# Patient Record
Sex: MALE | Race: WHITE | Employment: OTHER
[De-identification: names, ages, dates, MRNs, and addresses within clinical notes are randomized per-mention and may not be internally consistent; named-entity substitution may affect disease eponyms.]

---

## 2022-03-18 PROBLEM — G45.9 TIA (TRANSIENT ISCHEMIC ATTACK): Status: ACTIVE | Noted: 2017-03-27

## 2022-03-19 PROBLEM — Z86.73 HISTORY OF TIA (TRANSIENT ISCHEMIC ATTACK): Status: ACTIVE | Noted: 2018-08-16

## 2022-03-19 PROBLEM — I10 ESSENTIAL HYPERTENSION: Status: ACTIVE | Noted: 2017-03-27

## 2022-03-20 PROBLEM — R00.2 PALPITATIONS: Status: ACTIVE | Noted: 2017-03-27

## 2022-06-23 ENCOUNTER — TELEPHONE (OUTPATIENT)
Dept: INTERNAL MEDICINE CLINIC | Facility: CLINIC | Age: 72
End: 2022-06-23

## 2022-06-23 NOTE — TELEPHONE ENCOUNTER
----- Message from April Nagle sent at 6/23/2022  1:39 PM EDT -----  Subject: Message to Provider    QUESTIONS  Information for Provider? Patient spoke with Sussy Leos earlier today. He   states the information can be faxed to [phone] at the [facility], attention   Dr. Davon Jacome. Any other questions please call patient back to advise,   thank you   ---------------------------------------------------------------------------  --------------  CALL BACK INFO  What is the best way for the office to contact you? OK to leave message on   voicemail  Preferred Call Back Phone Number? [phone]  ---------------------------------------------------------------------------  --------------  SCRIPT ANSWERS  Relationship to Patient?  Self

## 2022-06-23 NOTE — TELEPHONE ENCOUNTER
Pt report that the [place] will need med record and dexa scan for them to provided the prolia injection I have left a message with the [place] requesting a fax number that I can send these records to.

## 2022-06-23 NOTE — TELEPHONE ENCOUNTER
Pt called and stated that he may be able to get his Prolia injection through the [place]. Please call him to discuss this.

## 2022-06-24 ENCOUNTER — TELEPHONE (OUTPATIENT)
Dept: INTERNAL MEDICINE CLINIC | Facility: CLINIC | Age: 72
End: 2022-06-24

## 2022-06-24 NOTE — TELEPHONE ENCOUNTER
----- Message from Bridgett Roger sent at 6/24/2022  3:23 PM EDT -----  Subject: Message to Provider    QUESTIONS  Information for Provider? Pt states he needs to speak with someone   regarding a Prolia injection. He states that he is trying to have this   [facility], but in order to do so they need something from his   pcp stating why he needs this, along with pts bone density results and   pcps notes. Pt would like to have this in the form of an email. Please   email this at [email or web address]. Please call the pt when this has been   done  ---------------------------------------------------------------------------  --------------  CALL BACK INFO  What is the best way for the office to contact you? OK to leave message on   voicemail  Preferred Call Back Phone Number? [phone]  ---------------------------------------------------------------------------  --------------  SCRIPT ANSWERS  Relationship to Patient?  Self

## 2022-06-27 NOTE — TELEPHONE ENCOUNTER
Is the patient needing a letter from Dr Darius Paniagua? Or will the Notes work? It can be emailed from the printer up front.

## 2022-06-29 ENCOUNTER — PATIENT MESSAGE (OUTPATIENT)
Dept: INTERNAL MEDICINE CLINIC | Facility: CLINIC | Age: 72
End: 2022-06-29

## 2022-06-29 NOTE — TELEPHONE ENCOUNTER
From: Clayton Hdz  Sent: 6/29/2022 4:48 PM EDT  To: [facility] Clinical Staff  Subject: [facility]    According to Dr. Yenny Dale this information and charts have to be sent in a secure login under my name for Dr. Yenny Dale to see them. I would like for you to send them to me and I will attach them to my secure [facility] to Dr. Yenny Dale. I dont know the problem but that is the procedure she wants.

## 2022-08-04 ENCOUNTER — TELEPHONE (OUTPATIENT)
Dept: INTERNAL MEDICINE CLINIC | Facility: CLINIC | Age: 72
End: 2022-08-04

## 2022-08-04 NOTE — TELEPHONE ENCOUNTER
Lisbeth with [facility] called and stated that the patient would like them to help cover his medication prolia and they need the chart notes faxed about the medication that is being prescribed. She is also faxing over a request as well.

## 2022-09-12 SDOH — HEALTH STABILITY: PHYSICAL HEALTH: ON AVERAGE, HOW MANY DAYS PER WEEK DO YOU ENGAGE IN MODERATE TO STRENUOUS EXERCISE (LIKE A BRISK WALK)?: 5 DAYS

## 2022-09-12 SDOH — HEALTH STABILITY: PHYSICAL HEALTH: ON AVERAGE, HOW MANY MINUTES DO YOU ENGAGE IN EXERCISE AT THIS LEVEL?: 40 MIN

## 2022-09-12 ASSESSMENT — LIFESTYLE VARIABLES
HOW MANY STANDARD DRINKS CONTAINING ALCOHOL DO YOU HAVE ON A TYPICAL DAY: 1 OR 2
HOW OFTEN DO YOU HAVE A DRINK CONTAINING ALCOHOL: 3
HOW OFTEN DO YOU HAVE SIX OR MORE DRINKS ON ONE OCCASION: 1
HOW OFTEN DO YOU HAVE A DRINK CONTAINING ALCOHOL: 2-4 TIMES A MONTH
HOW MANY STANDARD DRINKS CONTAINING ALCOHOL DO YOU HAVE ON A TYPICAL DAY: 1

## 2022-09-12 ASSESSMENT — PATIENT HEALTH QUESTIONNAIRE - PHQ9
SUM OF ALL RESPONSES TO PHQ QUESTIONS 1-9: 0
SUM OF ALL RESPONSES TO PHQ9 QUESTIONS 1 & 2: 0
1. LITTLE INTEREST OR PLEASURE IN DOING THINGS: 0
SUM OF ALL RESPONSES TO PHQ QUESTIONS 1-9: 0
2. FEELING DOWN, DEPRESSED OR HOPELESS: 0

## 2022-09-19 ENCOUNTER — OFFICE VISIT (OUTPATIENT)
Dept: INTERNAL MEDICINE CLINIC | Facility: CLINIC | Age: 72
End: 2022-09-19
Payer: MEDICARE

## 2022-09-19 VITALS
TEMPERATURE: 97.7 F | RESPIRATION RATE: 16 BRPM | OXYGEN SATURATION: 99 % | SYSTOLIC BLOOD PRESSURE: 145 MMHG | WEIGHT: 145 LBS | HEIGHT: 66 IN | BODY MASS INDEX: 23.3 KG/M2 | HEART RATE: 60 BPM | DIASTOLIC BLOOD PRESSURE: 71 MMHG

## 2022-09-19 DIAGNOSIS — I10 ESSENTIAL (PRIMARY) HYPERTENSION: ICD-10-CM

## 2022-09-19 DIAGNOSIS — E78.2 MIXED HYPERLIPIDEMIA: ICD-10-CM

## 2022-09-19 DIAGNOSIS — M81.0 AGE-RELATED OSTEOPOROSIS WITHOUT CURRENT PATHOLOGICAL FRACTURE: ICD-10-CM

## 2022-09-19 DIAGNOSIS — Z00.00 MEDICARE ANNUAL WELLNESS VISIT, SUBSEQUENT: Primary | ICD-10-CM

## 2022-09-19 DIAGNOSIS — R41.3 MEMORY CHANGE: ICD-10-CM

## 2022-09-19 DIAGNOSIS — Z86.73 HISTORY OF TIA (TRANSIENT ISCHEMIC ATTACK): ICD-10-CM

## 2022-09-19 DIAGNOSIS — Z23 ENCOUNTER FOR IMMUNIZATION: ICD-10-CM

## 2022-09-19 PROCEDURE — G0008 ADMIN INFLUENZA VIRUS VAC: HCPCS | Performed by: INTERNAL MEDICINE

## 2022-09-19 PROCEDURE — G0439 PPPS, SUBSEQ VISIT: HCPCS | Performed by: INTERNAL MEDICINE

## 2022-09-19 PROCEDURE — 90694 VACC AIIV4 NO PRSRV 0.5ML IM: CPT | Performed by: INTERNAL MEDICINE

## 2022-09-19 PROCEDURE — 1123F ACP DISCUSS/DSCN MKR DOCD: CPT | Performed by: INTERNAL MEDICINE

## 2022-09-19 PROCEDURE — 99213 OFFICE O/P EST LOW 20 MIN: CPT | Performed by: INTERNAL MEDICINE

## 2022-09-19 ASSESSMENT — PATIENT HEALTH QUESTIONNAIRE - PHQ9
SUM OF ALL RESPONSES TO PHQ QUESTIONS 1-9: 0
2. FEELING DOWN, DEPRESSED OR HOPELESS: 0
SUM OF ALL RESPONSES TO PHQ QUESTIONS 1-9: 0
1. LITTLE INTEREST OR PLEASURE IN DOING THINGS: 0
SUM OF ALL RESPONSES TO PHQ9 QUESTIONS 1 & 2: 0

## 2022-09-19 ASSESSMENT — LIFESTYLE VARIABLES
HOW MANY STANDARD DRINKS CONTAINING ALCOHOL DO YOU HAVE ON A TYPICAL DAY: 1 OR 2
HOW OFTEN DO YOU HAVE A DRINK CONTAINING ALCOHOL: 2-4 TIMES A MONTH

## 2022-09-19 NOTE — PROGRESS NOTES
Medicare Annual Wellness Visit    Kevyn Fung is here for Medicare AWV, Follow-up Chronic Condition (6 month f/u), and Discuss Medications (? About prevagen)    Assessment & Plan   Medicare annual wellness visit, subsequent  Essential (primary) hypertension  -     Basic Metabolic Panel; Future  -     Vitamin D 25 Hydroxy; Future  -     TSH; Future  Mixed hyperlipidemia  Age-related osteoporosis without current pathological fracture  -     Basic Metabolic Panel; Future  -     Vitamin D 25 Hydroxy; Future  History of TIA (transient ischemic attack)  Encounter for immunization  -     Influenza, FLUAD, (age 72 y+), IM, Preservative Free, 0.5 mL  Memory change  -     TSH; Future  -     Vitamin B12; Future   Will try to assist in getting his Prolia injection using his medicare insurance. Will have him return for visit to address his memory concerns specifically. Recommendations for Preventive Services Due: see orders and patient instructions/AVS.  Recommended screening schedule for the next 5-10 years is provided to the patient in written form: see Patient Instructions/AVS.  6 months routine follow up    [facility] will cover the cost of his Prolia but has to have it done at      Return for Medicare Annual Wellness Visit in 1 year. Subjective   The following acute and/or chronic problems were also addressed today:  Chronic medical problems HTN, HLD, GERD. TIA, Osteoporosis   He has been doing well with no new complaints. No new neuro symptoms. No cp or sob. Zocor for HLD and also on low fat diet. He takes PPI forGERD symptoms. On prolia for osteoporosis. Sometimes has pain in the lower back feels like vertebra is slipping back in nd forths. Not quite as sharp. Issues with names. Having some issues getting prolia injections throught [facility] [address] [facility]. He is already 2 months late getting his injection. May not be able to obtain until next year!!! Studies show significant bone loss once meds discontinued.  He is quite normal rate, normal S1 and S2, no gallops, intact distal pulses, and no carotid bruits  Abdomen: soft, non-tender, non-distended, normal bowel sounds, no masses or organomegaly  Extremities: no cyanosis and no clubbing  Neurologic: gait and coordination normal and speech normal       No Known Allergies  Prior to Visit Medications    Medication Sig Taking? Authorizing Provider   amLODIPine (NORVASC) 2.5 MG tablet TAKE ONE TABLET BY MOUTH EVERY DAY FOR BLOOD PRESSURE Yes Ar Automatic Reconciliation   aspirin 81 MG EC tablet Take 81 mg by mouth daily Yes Ar Automatic Reconciliation   clobetasol (TEMOVATE) 0.05 % external solution APPLY TO SCALP TWICE DAILY.  Yes Ar Automatic Reconciliation   omeprazole (PRILOSEC) 20 MG delayed release capsule TAKE ONE CAPSULE BY MOUTH EVERY DAY Yes Ar Automatic Reconciliation   simvastatin (ZOCOR) 20 MG tablet TAKE ONE TABLET BY MOUTH EVERY EVENING FOR CHOLESTEROL Yes Ar Automatic Reconciliation   denosumab (PROLIA) 60 MG/ML SOSY SC injection Inject 60 mg into the skin  Patient not taking: Reported on 9/19/2022  Ar Automatic Reconciliation       CareTeam (Including outside providers/suppliers regularly involved in providing care):   Patient Care Team:  Lyman Angelucci, MD as PCP - Fabián Giang MD as PCP - [facility] Empaneled Provider     Reviewed and updated this visit:  Allergies  Meds  Med Hx  Surg Hx  Soc Hx  Fam Hx

## 2022-09-19 NOTE — PATIENT INSTRUCTIONS
Personalized Preventive Plan for Ophelia Lim - 9/19/2022  Medicare offers a range of preventive health benefits. Some of the tests and screenings are paid in full while other may be subject to a deductible, co-insurance, and/or copay. Some of these benefits include a comprehensive review of your medical history including lifestyle, illnesses that may run in your family, and various assessments and screenings as appropriate. After reviewing your medical record and screening and assessments performed today your provider may have ordered immunizations, labs, imaging, and/or referrals for you. A list of these orders (if applicable) as well as your Preventive Care list are included within your After Visit Summary for your review. Other Preventive Recommendations:    A preventive eye exam performed by an eye specialist is recommended every 1-2 years to screen for glaucoma; cataracts, macular degeneration, and other eye disorders. A preventive dental visit is recommended every 6 months. Try to get at least 150 minutes of exercise per week or 10,000 steps per day on a pedometer . Order or download the FREE \"Exercise & Physical Activity: Your Everyday Guide\" from [employer]. Call 1-256.743.1887 or search [employer] online. You need 9151-9172 mg of calcium and 2944-6374 IU of vitamin D per day. It is possible to meet your calcium requirement with diet alone, but a vitamin D supplement is usually necessary to meet this goal.  When exposed to the sun, use a sunscreen that protects against both UVA and UVB radiation with an SPF of 30 or greater. Reapply every 2 to 3 hours or after sweating, drying off with a towel, or swimming. Always wear a seat belt when traveling in a car. Always wear a helmet when riding a bicycle or motorcycle.

## 2022-09-21 ENCOUNTER — NURSE ONLY (OUTPATIENT)
Dept: INTERNAL MEDICINE CLINIC | Facility: CLINIC | Age: 72
End: 2022-09-21

## 2022-09-21 DIAGNOSIS — R41.3 MEMORY CHANGE: ICD-10-CM

## 2022-09-21 DIAGNOSIS — M81.0 AGE-RELATED OSTEOPOROSIS WITHOUT CURRENT PATHOLOGICAL FRACTURE: Primary | ICD-10-CM

## 2022-09-21 DIAGNOSIS — M81.0 AGE-RELATED OSTEOPOROSIS WITHOUT CURRENT PATHOLOGICAL FRACTURE: ICD-10-CM

## 2022-09-21 DIAGNOSIS — I10 ESSENTIAL (PRIMARY) HYPERTENSION: ICD-10-CM

## 2022-09-22 LAB
25(OH)D3 SERPL-MCNC: 60.2 NG/ML (ref 30–100)
ANION GAP SERPL CALC-SCNC: 4 MMOL/L (ref 4–13)
BUN SERPL-MCNC: 13 MG/DL (ref 8–23)
CALCIUM SERPL-MCNC: 9.4 MG/DL (ref 8.3–10.4)
CHLORIDE SERPL-SCNC: 108 MMOL/L (ref 101–110)
CO2 SERPL-SCNC: 28 MMOL/L (ref 21–32)
CREAT SERPL-MCNC: 1 MG/DL (ref 0.8–1.5)
GLUCOSE SERPL-MCNC: 109 MG/DL (ref 65–100)
POTASSIUM SERPL-SCNC: 4 MMOL/L (ref 3.5–5.1)
SODIUM SERPL-SCNC: 140 MMOL/L (ref 136–145)
TSH, 3RD GENERATION: 1.72 UIU/ML (ref 0.36–3.74)
VIT B12 SERPL-MCNC: 395 PG/ML (ref 193–986)

## 2022-10-11 ENCOUNTER — TELEPHONE (OUTPATIENT)
Dept: INTERNAL MEDICINE CLINIC | Facility: CLINIC | Age: 72
End: 2022-10-11

## 2022-10-11 ENCOUNTER — NURSE ONLY (OUTPATIENT)
Dept: INTERNAL MEDICINE CLINIC | Facility: CLINIC | Age: 72
End: 2022-10-11

## 2022-10-11 DIAGNOSIS — M81.0 AGE-RELATED OSTEOPOROSIS WITHOUT CURRENT PATHOLOGICAL FRACTURE: Primary | ICD-10-CM

## 2022-10-11 DIAGNOSIS — M81.0 AGE-RELATED OSTEOPOROSIS WITHOUT CURRENT PATHOLOGICAL FRACTURE: ICD-10-CM

## 2022-10-11 NOTE — TELEPHONE ENCOUNTER
Patient will need to have a vitamin d and calcium drawn before they can have the prolia injection. It has to be within 14 days of the appointment.  Patient can either come to our office and have it drawn or either to the Outpatient Lab,

## 2022-10-12 LAB
25(OH)D3 SERPL-MCNC: 60.7 NG/ML (ref 30–100)
CALCIUM SERPL-MCNC: 9.1 MG/DL (ref 8.3–10.4)

## 2022-10-13 ENCOUNTER — TELEPHONE (OUTPATIENT)
Dept: INTERNAL MEDICINE CLINIC | Facility: CLINIC | Age: 72
End: 2022-10-13

## 2022-10-13 NOTE — TELEPHONE ENCOUNTER
His calcium and vitamin d levels were normal. Please assist in getting scheduled for Prolia at [facility].   Tanya Ward MD

## 2022-10-17 ENCOUNTER — OFFICE VISIT (OUTPATIENT)
Dept: INTERNAL MEDICINE CLINIC | Facility: CLINIC | Age: 72
End: 2022-10-17
Payer: OTHER GOVERNMENT

## 2022-10-17 VITALS
OXYGEN SATURATION: 97 % | TEMPERATURE: 98.2 F | DIASTOLIC BLOOD PRESSURE: 68 MMHG | WEIGHT: 147 LBS | HEART RATE: 90 BPM | BODY MASS INDEX: 23.63 KG/M2 | SYSTOLIC BLOOD PRESSURE: 119 MMHG | RESPIRATION RATE: 16 BRPM | HEIGHT: 66 IN

## 2022-10-17 DIAGNOSIS — M81.0 AGE-RELATED OSTEOPOROSIS WITHOUT CURRENT PATHOLOGICAL FRACTURE: Primary | ICD-10-CM

## 2022-10-17 DIAGNOSIS — R41.3 MEMORY CHANGE: ICD-10-CM

## 2022-10-17 PROCEDURE — 3078F DIAST BP <80 MM HG: CPT | Performed by: INTERNAL MEDICINE

## 2022-10-17 PROCEDURE — 99212 OFFICE O/P EST SF 10 MIN: CPT | Performed by: INTERNAL MEDICINE

## 2022-10-17 PROCEDURE — 3074F SYST BP LT 130 MM HG: CPT | Performed by: INTERNAL MEDICINE

## 2022-10-17 PROCEDURE — 1123F ACP DISCUSS/DSCN MKR DOCD: CPT | Performed by: INTERNAL MEDICINE

## 2022-10-17 RX ORDER — CETIRIZINE HYDROCHLORIDE 10 MG/1
10 TABLET ORAL DAILY
COMMUNITY

## 2022-10-17 ASSESSMENT — PATIENT HEALTH QUESTIONNAIRE - PHQ9
SUM OF ALL RESPONSES TO PHQ QUESTIONS 1-9: 0
SUM OF ALL RESPONSES TO PHQ QUESTIONS 1-9: 0
1. LITTLE INTEREST OR PLEASURE IN DOING THINGS: 0
SUM OF ALL RESPONSES TO PHQ QUESTIONS 1-9: 0
2. FEELING DOWN, DEPRESSED OR HOPELESS: 0
SUM OF ALL RESPONSES TO PHQ9 QUESTIONS 1 & 2: 0
SUM OF ALL RESPONSES TO PHQ QUESTIONS 1-9: 0

## 2022-10-26 ENCOUNTER — NURSE ONLY (OUTPATIENT)
Dept: INTERNAL MEDICINE CLINIC | Facility: CLINIC | Age: 72
End: 2022-10-26

## 2022-10-26 DIAGNOSIS — D64.9 ANEMIA, UNSPECIFIED TYPE: ICD-10-CM

## 2022-10-27 LAB
BASOPHILS # BLD: 0.1 K/UL (ref 0–0.2)
BASOPHILS NFR BLD: 1 % (ref 0–2)
DIFFERENTIAL METHOD BLD: ABNORMAL
EOSINOPHIL # BLD: 0.3 K/UL (ref 0–0.8)
EOSINOPHIL NFR BLD: 5 % (ref 0.5–7.8)
ERYTHROCYTE [DISTWIDTH] IN BLOOD BY AUTOMATED COUNT: 14.6 % (ref 11.9–14.6)
FERRITIN SERPL-MCNC: 10 NG/ML (ref 8–388)
HCT VFR BLD AUTO: 36.9 % (ref 41.1–50.3)
HGB BLD-MCNC: 11.2 G/DL (ref 13.6–17.2)
IMM GRANULOCYTES # BLD AUTO: 0 K/UL (ref 0–0.5)
IMM GRANULOCYTES NFR BLD AUTO: 0 % (ref 0–5)
IRON SERPL-MCNC: 32 UG/DL (ref 35–150)
LYMPHOCYTES # BLD: 2.2 K/UL (ref 0.5–4.6)
LYMPHOCYTES NFR BLD: 38 % (ref 13–44)
MCH RBC QN AUTO: 27 PG (ref 26.1–32.9)
MCHC RBC AUTO-ENTMCNC: 30.4 G/DL (ref 31.4–35)
MCV RBC AUTO: 88.9 FL (ref 82–102)
MONOCYTES # BLD: 0.5 K/UL (ref 0.1–1.3)
MONOCYTES NFR BLD: 9 % (ref 4–12)
NEUTS SEG # BLD: 2.8 K/UL (ref 1.7–8.2)
NEUTS SEG NFR BLD: 48 % (ref 43–78)
NRBC # BLD: 0 K/UL (ref 0–0.2)
PLATELET # BLD AUTO: 206 K/UL (ref 150–450)
PMV BLD AUTO: 9.8 FL (ref 9.4–12.3)
RBC # BLD AUTO: 4.15 M/UL (ref 4.23–5.6)
VIT B12 SERPL-MCNC: 416 PG/ML (ref 193–986)
WBC # BLD AUTO: 5.8 K/UL (ref 4.3–11.1)

## 2022-10-27 NOTE — PROGRESS NOTES
10/17/2022   Location:[facility] [address] [facility]  [place]  Patient #:  [id number]  YOB: 1950        History of Present Illness     Chief Complaint   Patient presents with    Memory Loss       Mr. Nette Sheikh is a 67 y.o. male  who presents for Follow up on chronic medical issues. There is compliance and tolerance with medications. Feels like he is a kelley more forgetful with names and events. Remembers meds. He mention at last visit. Wife also mentioned when she was here for her appt. No forgetting conversations. He does have hearing issues and he has lost     Still waiting to appointment to get Prolia injection at [facility] in [facility]. He has been on Prolia for years. Want to avoid delay in getting injection.     Last Labs  CBC with Differential:    Lab Results   Component Value Date/Time    WBC 5.8 10/26/2022 03:24 PM    RBC 4.15 10/26/2022 03:24 PM    HGB 11.2 10/26/2022 03:24 PM    HCT 36.9 10/26/2022 03:24 PM     10/26/2022 03:24 PM    MCV 88.9 10/26/2022 03:24 PM    MCH 27.0 10/26/2022 03:24 PM    MCHC 30.4 10/26/2022 03:24 PM    RDW 14.6 10/26/2022 03:24 PM    NRBC 0.00 10/26/2022 03:24 PM    LYMPHOPCT 38 10/26/2022 03:24 PM    LYMPHOPCT 37 09/13/2021 10:25 AM    MONOPCT 9 10/26/2022 03:24 PM    MONOPCT 9 09/13/2021 10:25 AM    BASOPCT 1 10/26/2022 03:24 PM    BASOPCT 1 09/13/2021 10:25 AM    MONOSABS 0.5 10/26/2022 03:24 PM    MONOSABS 0.4 09/13/2021 10:25 AM    LYMPHSABS 2.2 10/26/2022 03:24 PM    LYMPHSABS 1.9 09/13/2021 10:25 AM    EOSABS 0.3 10/26/2022 03:24 PM    EOSABS 0.2 09/13/2021 10:25 AM    BASOSABS 0.1 10/26/2022 03:24 PM    DIFFTYPE AUTOMATED 10/26/2022 03:24 PM     BMP:    Lab Results   Component Value Date/Time     09/21/2022 08:05 AM    K 4.0 09/21/2022 08:05 AM     09/21/2022 08:05 AM    CO2 28 09/21/2022 08:05 AM    BUN 13 09/21/2022 08:05 AM    LABALBU 4.7 09/13/2021 10:25 AM    CREATININE 1.00 09/21/2022 08:05 AM    CALCIUM 9.1 10/11/2022 03:29 PM    GFRAA >60 09/21/2022 08:05 AM    LABGLOM >60 09/21/2022 08:05 AM    GLUCOSE 109 09/21/2022 08:05 AM     FLP:    Lab Results   Component Value Date/Time    TRIG 78 09/13/2021 10:25 AM    HDL 47 09/13/2021 10:25 AM    LDLCALC 89 09/13/2021 10:25 AM    LABVLDL 14 08/26/2019 08:39 AM     TSH:    Lab Results   Component Value Date/Time    TSH 1.320 09/13/2021 10:25 AM     Lab Results   Component Value Date/Time    VITD25 60.7 10/11/2022 03:29 PM      Lab Results   Component Value Date    PBIYGACD30 666 09/21/2022         No Known Allergies  Past Medical History:   Diagnosis Date    Congenital tracheoesophageal fistula, esophageal atresia and stenosis     Esophageal reflux     Essential hypertension 3/27/2017    GERD (gastroesophageal reflux disease)     History of TIA (transient ischemic attack) 8/16/2018    Hypercholesterolemia     Inguinal hernia without mention of obstruction or gangrene, unilateral or unspecified, (not specified as recurrent)     Other and unspecified hyperlipidemia     Palpitations 3/27/2017    Psoriasis     Senile osteoporosis     Stroke [facility])     TIA (transient ischemic attack) 3/27/2017    Unspecified cataract      Social History     Socioeconomic History    Marital status:      Spouse name: None    Number of children: None    Years of education: None    Highest education level: None   Tobacco Use    Smoking status: Never    Smokeless tobacco: Never   Substance and Sexual Activity    Alcohol use: Yes    Drug use: No     Social Determinants of Health     Physical Activity: Sufficiently Active    Days of Exercise per Week: 5 days    Minutes of Exercise per Session: 50 min     Past Surgical History:   Procedure Laterality Date    CATARACT REMOVAL Left 2/24/15    CATARACT REMOVAL Right 2/3/15    insert prosthetic lens     GI      colonoscopy times 2    HERNIA REPAIR Right 3/24/14    HIP FRACTURE SURGERY Left 7/12/15    ORTHOPEDIC SURGERY      left thumb     VASECTOMY  1985     Family History   Problem Relation Age of Onset    Hypertension Mother     Diabetes Maternal Grandmother     Glaucoma Mother     Dementia Father     Prostate Cancer Father      Current Outpatient Medications   Medication Sig Dispense Refill    cetirizine (ZYRTEC) 10 MG tablet Take 10 mg by mouth daily      denosumab (PROLIA) 60 MG/ML SOSY SC injection Inject 1 mL into the skin every 6 months 1 each 1    amLODIPine (NORVASC) 2.5 MG tablet TAKE ONE TABLET BY MOUTH EVERY DAY FOR BLOOD PRESSURE      aspirin 81 MG EC tablet Take 81 mg by mouth daily      clobetasol (TEMOVATE) 0.05 % external solution APPLY TO SCALP TWICE DAILY. omeprazole (PRILOSEC) 20 MG delayed release capsule TAKE ONE CAPSULE BY MOUTH EVERY DAY      simvastatin (ZOCOR) 20 MG tablet TAKE ONE TABLET BY MOUTH EVERY EVENING FOR CHOLESTEROL      denosumab (PROLIA) 60 MG/ML SOSY SC injection Inject 60 mg into the skin (Patient not taking: No sig reported)       No current facility-administered medications for this visit. Health Maintenance   Topic Date Due    Hepatitis C screen  Never done    COVID-19 Vaccine (4 - Booster for Moderna series) 12/25/2021    Lipids  09/13/2022    Colorectal Cancer Screen  02/06/2023    DEXA (modify frequency per FRAX score)  03/11/2023    Annual Wellness Visit (AWV)  09/20/2023    Depression Screen  10/17/2023    DTaP/Tdap/Td vaccine (3 - Td or Tdap) 04/20/2026    Flu vaccine  Completed    Shingles vaccine  Completed    Pneumococcal 65+ years Vaccine  Completed    Hepatitis A vaccine  Aged Out    Hib vaccine  Aged Out    Meningococcal (ACWY) vaccine  Aged Out             Review of Systems  Review of Systems   Psychiatric/Behavioral:  Positive for decreased concentration.       /68 (Site: Left Upper Arm, Position: Sitting)   Pulse 90   Temp 98.2 °F (36.8 °C) (Temporal)   Resp 16   Ht 5' 5.5\" (1.664 m)   Wt 147 lb (66.7 kg)   SpO2 97%   BMI 24.09 kg/m²     Wt Readings from Last 3 Encounters:   10/17/22 147 lb (66.7 kg)   09/19/22 145 lb (65.8 kg)   03/18/22 155 lb 9.6 oz (70.6 kg)       Physical Exam    Physical Exam  Vitals and nursing note reviewed. Neurological:      General: No focal deficit present. Assessment & Plan    Encounter Diagnoses   Name Primary? Age-related osteoporosis without current pathological fracture Yes    Memory change        29/30 MMSE, clock draw correct, did well with animals in    Secs  Monitor and repeat MMSE periodically. Follow up on scheduling Prolia. No follow-ups on file.         Tanya Ward MD

## 2022-10-28 ENCOUNTER — TELEPHONE (OUTPATIENT)
Dept: INTERNAL MEDICINE CLINIC | Facility: CLINIC | Age: 72
End: 2022-10-28
Payer: MEDICARE

## 2022-10-28 DIAGNOSIS — D64.9 ANEMIA, UNSPECIFIED TYPE: Primary | ICD-10-CM

## 2022-10-28 PROCEDURE — G0328 FECAL BLOOD SCRN IMMUNOASSAY: HCPCS | Performed by: INTERNAL MEDICINE

## 2022-10-31 LAB
HEMOCCULT STL QL: NORMAL
VALID INTERNAL CONTROL: YES

## 2023-01-10 RX ORDER — OMEPRAZOLE 20 MG/1
CAPSULE, DELAYED RELEASE ORAL
Qty: 90 CAPSULE | Refills: 3 | Status: SHIPPED | OUTPATIENT
Start: 2023-01-10

## 2023-01-10 RX ORDER — AMLODIPINE BESYLATE 2.5 MG/1
TABLET ORAL
Qty: 90 TABLET | Refills: 3 | Status: SHIPPED | OUTPATIENT
Start: 2023-01-10

## 2023-01-10 RX ORDER — SIMVASTATIN 20 MG
TABLET ORAL
Qty: 90 TABLET | Refills: 3 | Status: SHIPPED | OUTPATIENT
Start: 2023-01-10

## 2023-01-25 ENCOUNTER — OFFICE VISIT (OUTPATIENT)
Dept: ENDOCRINOLOGY | Age: 73
End: 2023-01-25
Payer: OTHER GOVERNMENT

## 2023-01-25 VITALS
SYSTOLIC BLOOD PRESSURE: 152 MMHG | DIASTOLIC BLOOD PRESSURE: 82 MMHG | WEIGHT: 152 LBS | HEART RATE: 73 BPM | OXYGEN SATURATION: 99 % | BODY MASS INDEX: 24.91 KG/M2

## 2023-01-25 DIAGNOSIS — M81.0 AGE-RELATED OSTEOPOROSIS WITHOUT CURRENT PATHOLOGICAL FRACTURE: ICD-10-CM

## 2023-01-25 DIAGNOSIS — M81.0 AGE-RELATED OSTEOPOROSIS WITHOUT CURRENT PATHOLOGICAL FRACTURE: Primary | ICD-10-CM

## 2023-01-25 LAB
25(OH)D3 SERPL-MCNC: 47.6 NG/ML (ref 30–100)
ALBUMIN SERPL-MCNC: 3.9 G/DL (ref 3.2–4.6)
ALBUMIN/GLOB SERPL: 1.1 (ref 0.4–1.6)
ALP SERPL-CCNC: 63 U/L (ref 50–136)
ALT SERPL-CCNC: 29 U/L (ref 12–65)
ANION GAP SERPL CALC-SCNC: 8 MMOL/L (ref 2–11)
AST SERPL-CCNC: 21 U/L (ref 15–37)
BILIRUB SERPL-MCNC: 0.5 MG/DL (ref 0.2–1.1)
BUN SERPL-MCNC: 8 MG/DL (ref 8–23)
CALCIUM SERPL-MCNC: 8.6 MG/DL (ref 8.3–10.4)
CHLORIDE SERPL-SCNC: 107 MMOL/L (ref 101–110)
CO2 SERPL-SCNC: 26 MMOL/L (ref 21–32)
CREAT SERPL-MCNC: 1.1 MG/DL (ref 0.8–1.5)
GLOBULIN SER CALC-MCNC: 3.5 G/DL (ref 2.8–4.5)
GLUCOSE SERPL-MCNC: 104 MG/DL (ref 65–100)
POTASSIUM SERPL-SCNC: 4 MMOL/L (ref 3.5–5.1)
PROT SERPL-MCNC: 7.4 G/DL (ref 6.3–8.2)
SODIUM SERPL-SCNC: 141 MMOL/L (ref 133–143)

## 2023-01-25 PROCEDURE — 3077F SYST BP >= 140 MM HG: CPT | Performed by: INTERNAL MEDICINE

## 2023-01-25 PROCEDURE — 3079F DIAST BP 80-89 MM HG: CPT | Performed by: INTERNAL MEDICINE

## 2023-01-25 PROCEDURE — 99203 OFFICE O/P NEW LOW 30 MIN: CPT | Performed by: INTERNAL MEDICINE

## 2023-01-25 PROCEDURE — 1123F ACP DISCUSS/DSCN MKR DOCD: CPT | Performed by: INTERNAL MEDICINE

## 2023-01-25 ASSESSMENT — ENCOUNTER SYMPTOMS
DIARRHEA: 0
CONSTIPATION: 0
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
## 2023-01-26 LAB
CALCIUM SERPL-MCNC: 8.6 MG/DL (ref 8.3–10.4)
PTH-INTACT SERPL-MCNC: 48.8 PG/ML (ref 18.5–88)

## 2023-01-27 DIAGNOSIS — M81.0 AGE-RELATED OSTEOPOROSIS WITHOUT CURRENT PATHOLOGICAL FRACTURE: ICD-10-CM

## 2023-01-27 LAB
COLLECT DURATION TIME UR: 24 HR
CREAT 24H UR-MRATE: 1416 MG/24 HR (ref 800–2000)
CREAT UR-MCNC: 59 MG/DL
SPECIMEN VOL ?TM UR: 2400 ML
TESTOST SERPL-MCNC: 569 NG/DL (ref 264–916)

## 2023-01-28 LAB
CALCIUM 24H UR-MCNC: 5.6 MG/DL
CALCIUM 24H UR-MRATE: 134 MG/24 HR (ref 0–320)
COLLECT DURATION TIME UR: 24 HR
SPECIMEN VOL ?TM UR: 2400 ML

## 2023-03-19 SDOH — ECONOMIC STABILITY: FOOD INSECURITY: WITHIN THE PAST 12 MONTHS, THE FOOD YOU BOUGHT JUST DIDN'T LAST AND YOU DIDN'T HAVE MONEY TO GET MORE.: NEVER TRUE

## 2023-03-19 SDOH — ECONOMIC STABILITY: HOUSING INSECURITY
IN THE LAST 12 MONTHS, WAS THERE A TIME WHEN YOU DID NOT HAVE A STEADY PLACE TO SLEEP OR SLEPT IN A SHELTER (INCLUDING NOW)?: NO

## 2023-03-19 SDOH — ECONOMIC STABILITY: INCOME INSECURITY: HOW HARD IS IT FOR YOU TO PAY FOR THE VERY BASICS LIKE FOOD, HOUSING, MEDICAL CARE, AND HEATING?: NOT HARD AT ALL

## 2023-03-19 SDOH — ECONOMIC STABILITY: FOOD INSECURITY: WITHIN THE PAST 12 MONTHS, YOU WORRIED THAT YOUR FOOD WOULD RUN OUT BEFORE YOU GOT MONEY TO BUY MORE.: NEVER TRUE

## 2023-03-19 SDOH — ECONOMIC STABILITY: TRANSPORTATION INSECURITY
IN THE PAST 12 MONTHS, HAS LACK OF TRANSPORTATION KEPT YOU FROM MEETINGS, WORK, OR FROM GETTING THINGS NEEDED FOR DAILY LIVING?: NO

## 2023-03-21 ENCOUNTER — OFFICE VISIT (OUTPATIENT)
Dept: INTERNAL MEDICINE CLINIC | Facility: CLINIC | Age: 73
End: 2023-03-21
Payer: MEDICARE

## 2023-03-21 VITALS
TEMPERATURE: 97.4 F | SYSTOLIC BLOOD PRESSURE: 136 MMHG | RESPIRATION RATE: 18 BRPM | BODY MASS INDEX: 23.46 KG/M2 | WEIGHT: 146 LBS | DIASTOLIC BLOOD PRESSURE: 76 MMHG | HEART RATE: 62 BPM | HEIGHT: 66 IN | OXYGEN SATURATION: 99 %

## 2023-03-21 DIAGNOSIS — R05.2 SUBACUTE COUGH: Primary | ICD-10-CM

## 2023-03-21 DIAGNOSIS — M81.0 AGE-RELATED OSTEOPOROSIS WITHOUT CURRENT PATHOLOGICAL FRACTURE: ICD-10-CM

## 2023-03-21 DIAGNOSIS — R35.1 NOCTURIA: ICD-10-CM

## 2023-03-21 DIAGNOSIS — I10 ESSENTIAL (PRIMARY) HYPERTENSION: ICD-10-CM

## 2023-03-21 DIAGNOSIS — J40 BRONCHITIS: ICD-10-CM

## 2023-03-21 LAB
BASOPHILS # BLD: 0.1 K/UL (ref 0–0.2)
BASOPHILS NFR BLD: 2 % (ref 0–2)
CHOLEST SERPL-MCNC: 155 MG/DL
DIFFERENTIAL METHOD BLD: ABNORMAL
EOSINOPHIL # BLD: 0.4 K/UL (ref 0–0.8)
EOSINOPHIL NFR BLD: 8 % (ref 0.5–7.8)
ERYTHROCYTE [DISTWIDTH] IN BLOOD BY AUTOMATED COUNT: 14.9 % (ref 11.9–14.6)
HCT VFR BLD AUTO: 35.4 % (ref 41.1–50.3)
HDLC SERPL-MCNC: 50 MG/DL (ref 40–60)
HDLC SERPL: 3.1
HGB BLD-MCNC: 12.7 G/DL (ref 13.6–17.2)
IMM GRANULOCYTES # BLD AUTO: 0 K/UL (ref 0–0.5)
IMM GRANULOCYTES NFR BLD AUTO: 0 % (ref 0–5)
LDLC SERPL CALC-MCNC: 89.6 MG/DL
LYMPHOCYTES # BLD: 2.1 K/UL (ref 0.5–4.6)
LYMPHOCYTES NFR BLD: 38 % (ref 13–44)
MCH RBC QN AUTO: 33.6 PG (ref 26.1–32.9)
MCHC RBC AUTO-ENTMCNC: 35.9 G/DL (ref 31.4–35)
MCV RBC AUTO: 93.7 FL (ref 82–102)
MONOCYTES # BLD: 0.5 K/UL (ref 0.1–1.3)
MONOCYTES NFR BLD: 10 % (ref 4–12)
NEUTS SEG # BLD: 2.3 K/UL (ref 1.7–8.2)
NEUTS SEG NFR BLD: 42 % (ref 43–78)
NRBC # BLD: 0 K/UL (ref 0–0.2)
PLATELET # BLD AUTO: 238 K/UL (ref 150–450)
PMV BLD AUTO: 9.3 FL (ref 9.4–12.3)
PSA SERPL-MCNC: 5 NG/ML
RBC # BLD AUTO: 3.78 M/UL (ref 4.23–5.6)
TRIGL SERPL-MCNC: 77 MG/DL (ref 35–150)
VLDLC SERPL CALC-MCNC: 15.4 MG/DL (ref 6–23)
WBC # BLD AUTO: 5.4 K/UL (ref 4.3–11.1)

## 2023-03-21 PROCEDURE — 1123F ACP DISCUSS/DSCN MKR DOCD: CPT | Performed by: INTERNAL MEDICINE

## 2023-03-21 PROCEDURE — 99214 OFFICE O/P EST MOD 30 MIN: CPT | Performed by: INTERNAL MEDICINE

## 2023-03-21 PROCEDURE — 3074F SYST BP LT 130 MM HG: CPT | Performed by: INTERNAL MEDICINE

## 2023-03-21 PROCEDURE — 3078F DIAST BP <80 MM HG: CPT | Performed by: INTERNAL MEDICINE

## 2023-03-21 RX ORDER — PHENOL 1.4 %
1 AEROSOL, SPRAY (ML) MUCOUS MEMBRANE 2 TIMES DAILY
Qty: 30 TABLET | Refills: 3 | COMMUNITY
Start: 2023-03-21

## 2023-03-21 RX ORDER — CEFUROXIME AXETIL 500 MG/1
500 TABLET ORAL 2 TIMES DAILY
Qty: 14 TABLET | Refills: 0 | Status: SHIPPED | OUTPATIENT
Start: 2023-03-21 | End: 2023-03-28

## 2023-03-21 RX ORDER — MULTIVIT-MIN/IRON/FOLIC ACID/K 18-600-40
1 CAPSULE ORAL DAILY
Qty: 30 CAPSULE | COMMUNITY
Start: 2023-03-21

## 2023-03-21 SDOH — ECONOMIC STABILITY: HOUSING INSECURITY
IN THE LAST 12 MONTHS, WAS THERE A TIME WHEN YOU DID NOT HAVE A STEADY PLACE TO SLEEP OR SLEPT IN A SHELTER (INCLUDING NOW)?: PATIENT REFUSED

## 2023-03-21 SDOH — ECONOMIC STABILITY: INCOME INSECURITY: HOW HARD IS IT FOR YOU TO PAY FOR THE VERY BASICS LIKE FOOD, HOUSING, MEDICAL CARE, AND HEATING?: PATIENT DECLINED

## 2023-03-21 SDOH — ECONOMIC STABILITY: FOOD INSECURITY: WITHIN THE PAST 12 MONTHS, THE FOOD YOU BOUGHT JUST DIDN'T LAST AND YOU DIDN'T HAVE MONEY TO GET MORE.: PATIENT DECLINED

## 2023-03-21 SDOH — ECONOMIC STABILITY: FOOD INSECURITY: WITHIN THE PAST 12 MONTHS, YOU WORRIED THAT YOUR FOOD WOULD RUN OUT BEFORE YOU GOT MONEY TO BUY MORE.: PATIENT DECLINED

## 2023-03-21 ASSESSMENT — PATIENT HEALTH QUESTIONNAIRE - PHQ9
SUM OF ALL RESPONSES TO PHQ QUESTIONS 1-9: 0
1. LITTLE INTEREST OR PLEASURE IN DOING THINGS: 0
SUM OF ALL RESPONSES TO PHQ QUESTIONS 1-9: 0
SUM OF ALL RESPONSES TO PHQ9 QUESTIONS 1 & 2: 0
2. FEELING DOWN, DEPRESSED OR HOPELESS: 0

## 2023-03-21 ASSESSMENT — ENCOUNTER SYMPTOMS
SHORTNESS OF BREATH: 0
COUGH: 1

## 2023-03-21 NOTE — PROGRESS NOTES
Panel     Standing Status:   Future     Number of Occurrences:   1     Standing Expiration Date:   3/21/2024    CBC with Auto Differential     Standing Status:   Future     Number of Occurrences:   1     Standing Expiration Date:   3/21/2024    PSA, Diagnostic     Standing Status:   Future     Number of Occurrences:   1     Standing Expiration Date:   3/21/2024       Treat bronchitis. Check CXR  The patient is asked to make an attempt to improve diet and exercise patterns to aid in medical management of this problem. Return in about 6 months (around 9/21/2023) for routine follow up of chronic medical issues, and as needed for new or worsening problems.         Collis Aase, MD

## 2023-03-22 ENCOUNTER — TELEPHONE (OUTPATIENT)
Dept: INTERNAL MEDICINE CLINIC | Facility: CLINIC | Age: 73
End: 2023-03-22

## 2023-03-22 DIAGNOSIS — R97.20 ELEVATED PSA: Primary | ICD-10-CM

## 2023-03-22 DIAGNOSIS — R05.2 SUBACUTE COUGH: ICD-10-CM

## 2023-03-22 NOTE — TELEPHONE ENCOUNTER
----- Message from Soumya Mclean MD sent at 3/21/2023  6:08 PM EDT -----  Hello Jaylon Castillo   chest X-ray was clear. Continue antibiotic for treatment of Bronchitis   Cholesterol is normal.  Your PSA has increased compared to a year ago. Given family history would recommend urology evaluation. If you agree I will send you to [address] [place] [address] for elevated PSA.   Soumya Mclean MD

## 2023-04-03 ENCOUNTER — PATIENT MESSAGE (OUTPATIENT)
Dept: ENDOCRINOLOGY | Age: 73
End: 2023-04-03

## 2023-04-03 DIAGNOSIS — M81.0 AGE-RELATED OSTEOPOROSIS WITHOUT CURRENT PATHOLOGICAL FRACTURE: Primary | ICD-10-CM

## 2023-04-03 RX ORDER — DENOSUMAB 60 MG/ML
60 INJECTION SUBCUTANEOUS
Qty: 1 EACH | Refills: 1
Start: 2023-04-03 | End: 2023-04-05

## 2023-04-03 NOTE — TELEPHONE ENCOUNTER
From: Chanel Vallejo  To: Dr. Azalea Navarrete: 4/3/2023 4:39 PM EDT  Subject: Prolia injection thru [address]     I saw you on Jan 25 and was sent to you by Dr. Rabia Lobato at the [facility] for the purpose of me getting authorization to take Prolia at [facility] expense. They have since approved my taking the injection at the [facility]. They told me today that you need to write me a prescription and enter it under my chart on [facility], I will be due my next injection on or after April 29th. Thank you!   Jessy Boyle 1950  [phone]

## 2023-04-04 ENCOUNTER — HOSPITAL ENCOUNTER (OUTPATIENT)
Dept: MRI IMAGING | Age: 73
Discharge: HOME | End: 2023-04-07
Payer: MEDICARE

## 2023-04-04 ENCOUNTER — OFFICE VISIT (OUTPATIENT)
Dept: INTERNAL MEDICINE CLINIC | Facility: CLINIC | Age: 73
End: 2023-04-04
Payer: MEDICARE

## 2023-04-04 VITALS
HEART RATE: 56 BPM | DIASTOLIC BLOOD PRESSURE: 69 MMHG | BODY MASS INDEX: 23.95 KG/M2 | RESPIRATION RATE: 16 BRPM | HEIGHT: 66 IN | TEMPERATURE: 98.4 F | WEIGHT: 149 LBS | OXYGEN SATURATION: 99 % | SYSTOLIC BLOOD PRESSURE: 138 MMHG

## 2023-04-04 DIAGNOSIS — E78.2 MIXED HYPERLIPIDEMIA: ICD-10-CM

## 2023-04-04 DIAGNOSIS — I10 ESSENTIAL HYPERTENSION: ICD-10-CM

## 2023-04-04 DIAGNOSIS — G45.9 TIA (TRANSIENT ISCHEMIC ATTACK): Primary | ICD-10-CM

## 2023-04-04 DIAGNOSIS — G45.9 TIA (TRANSIENT ISCHEMIC ATTACK): ICD-10-CM

## 2023-04-04 PROCEDURE — 99214 OFFICE O/P EST MOD 30 MIN: CPT | Performed by: INTERNAL MEDICINE

## 2023-04-04 PROCEDURE — 3079F DIAST BP 80-89 MM HG: CPT | Performed by: INTERNAL MEDICINE

## 2023-04-04 PROCEDURE — 1123F ACP DISCUSS/DSCN MKR DOCD: CPT | Performed by: INTERNAL MEDICINE

## 2023-04-04 PROCEDURE — 70551 MRI BRAIN STEM W/O DYE: CPT

## 2023-04-04 PROCEDURE — 3077F SYST BP >= 140 MM HG: CPT | Performed by: INTERNAL MEDICINE

## 2023-04-04 RX ORDER — FAMOTIDINE 20 MG/1
20 TABLET, FILM COATED ORAL DAILY
Qty: 16 TABLET | Refills: 3 | Status: SHIPPED | OUTPATIENT
Start: 2023-04-04

## 2023-04-04 ASSESSMENT — PATIENT HEALTH QUESTIONNAIRE - PHQ9
1. LITTLE INTEREST OR PLEASURE IN DOING THINGS: 0
2. FEELING DOWN, DEPRESSED OR HOPELESS: 0
SUM OF ALL RESPONSES TO PHQ QUESTIONS 1-9: 0
SUM OF ALL RESPONSES TO PHQ QUESTIONS 1-9: 0
SUM OF ALL RESPONSES TO PHQ9 QUESTIONS 1 & 2: 0
SUM OF ALL RESPONSES TO PHQ QUESTIONS 1-9: 0
SUM OF ALL RESPONSES TO PHQ QUESTIONS 1-9: 0

## 2023-04-04 NOTE — PROGRESS NOTES
have him go MRI to further evaluate. Make sure he is not at risk for stroke. He as an appt to see cardiology. Return in about 3 weeks (around 4/25/2023).         Govind Encinas MD

## 2023-04-05 ENCOUNTER — TELEPHONE (OUTPATIENT)
Dept: CARDIOLOGY CLINIC | Age: 73
End: 2023-04-05

## 2023-04-05 ENCOUNTER — TELEPHONE (OUTPATIENT)
Dept: INTERNAL MEDICINE CLINIC | Facility: CLINIC | Age: 73
End: 2023-04-05

## 2023-04-05 ENCOUNTER — INITIAL CONSULT (OUTPATIENT)
Dept: CARDIOLOGY CLINIC | Age: 73
End: 2023-04-05
Payer: MEDICARE

## 2023-04-05 VITALS
WEIGHT: 148 LBS | HEIGHT: 66 IN | SYSTOLIC BLOOD PRESSURE: 140 MMHG | BODY MASS INDEX: 23.78 KG/M2 | HEART RATE: 54 BPM | DIASTOLIC BLOOD PRESSURE: 80 MMHG

## 2023-04-05 DIAGNOSIS — I10 ESSENTIAL HYPERTENSION: ICD-10-CM

## 2023-04-05 DIAGNOSIS — R00.2 PALPITATIONS: ICD-10-CM

## 2023-04-05 DIAGNOSIS — G45.9 TIA (TRANSIENT ISCHEMIC ATTACK): Primary | ICD-10-CM

## 2023-04-05 PROCEDURE — 3079F DIAST BP 80-89 MM HG: CPT | Performed by: INTERNAL MEDICINE

## 2023-04-05 PROCEDURE — 99204 OFFICE O/P NEW MOD 45 MIN: CPT | Performed by: INTERNAL MEDICINE

## 2023-04-05 PROCEDURE — 3077F SYST BP >= 140 MM HG: CPT | Performed by: INTERNAL MEDICINE

## 2023-04-05 PROCEDURE — 1123F ACP DISCUSS/DSCN MKR DOCD: CPT | Performed by: INTERNAL MEDICINE

## 2023-04-05 PROCEDURE — 93000 ELECTROCARDIOGRAM COMPLETE: CPT | Performed by: INTERNAL MEDICINE

## 2023-04-05 RX ORDER — DENOSUMAB 60 MG/ML
60 INJECTION SUBCUTANEOUS
Qty: 1 EACH | Refills: 1
Start: 2023-04-05

## 2023-04-05 ASSESSMENT — ENCOUNTER SYMPTOMS: SHORTNESS OF BREATH: 0

## 2023-04-05 NOTE — TELEPHONE ENCOUNTER
Pt was seen today and he forgot some of his meds  to tell Dr Kamila Bustamante so he is requesting a nurse to please call him so she can add the med to his account

## 2023-04-05 NOTE — TELEPHONE ENCOUNTER
Radiology department called report that he is working on the stat MRI that was performed [facility] is requesting a detail neurological exam performed by the MD. ER has been submitted but they are needing the office notes from yesterday completed ASAP.  If you have an additional question you can call Nuria Hurtado at [phone]

## 2023-04-05 NOTE — PROGRESS NOTES
Results   Component Value Date    LDLCALC 89.6 03/21/2023    LDLCALC 89 09/13/2021    LDLCALC 68 08/31/2020     Lab Results   Component Value Date    LABVLDL 15.4 03/21/2023    LABVLDL 14 08/26/2019    VLDL 15 09/13/2021    VLDL 17 08/31/2020     Lab Results   Component Value Date    CHOLHDLRATIO 3.1 03/21/2023       EKG  Normal sinus rhythm        OUTSIDE RECORDS REVIEW    Records from outside providers have been reviewed and summarized as noted in the HPI, past history and data review sections of this note, and reviewed with patient. .       ASSESSMENT and PLAN    Chanell Quiñonez was seen today for consultation, hypertension, palpitations and transient ischemic attack. Diagnoses and all orders for this visit:    TIA (transient ischemic attack)  -     Transthoracic echocardiogram (TTE) complete with contrast, bubble, strain, and 3D PRN; Future  -     Cardiac event monitor; Future    Palpitations  -     EKG 12 Lead - Clinic Performed    Essential hypertension  -     EKG 12 Lead - Clinic Performed          IMPRESSION:  Mr. Parker Macias presents today for follow-up. Recent TIA with visual disturbances and word finding issues. No prior diagnosed cardiac disease although he does have hypertension and hyperlipidemia. Carotid ultrasound was negative. We discussed possibility of paroxysmal and occult atrial fibrillation as a common cause of stroke in adults. I will have him wear a 30-day event monitor to exclude any paroxysms of A-fib. He will also go for an echo to exclude any functional abnormalities or intracardiac thrombus. Patient to follow-up in 6 weeks. Return in about 6 weeks (around 5/17/2023). Thank you for allowing me to participate in this patient's care. Please call or contact me if there are any questions or concerns regarding the above.       Anam Davidson III, MD  04/05/23  2:15 PM

## 2023-04-06 NOTE — TELEPHONE ENCOUNTER
Spoke with patient, states he confused yesterday when reviewing medication and prolia was taken off but he does take. Patient just wanted to let us know and that it has been added back to his list by Dr. Yadi Bond.

## 2023-04-13 ENCOUNTER — TELEPHONE (OUTPATIENT)
Dept: INTERNAL MEDICINE CLINIC | Facility: CLINIC | Age: 73
End: 2023-04-13

## 2023-04-17 ENCOUNTER — HOSPITAL ENCOUNTER (OUTPATIENT)
Dept: LAB | Age: 73
Discharge: HOME | End: 2023-04-20
Payer: MEDICARE

## 2023-04-17 ENCOUNTER — OFFICE VISIT (OUTPATIENT)
Dept: ONCOLOGY | Age: 73
End: 2023-04-17
Payer: MEDICARE

## 2023-04-17 VITALS
SYSTOLIC BLOOD PRESSURE: 146 MMHG | BODY MASS INDEX: 23.63 KG/M2 | TEMPERATURE: 97.9 F | RESPIRATION RATE: 14 BRPM | HEIGHT: 66 IN | HEART RATE: 60 BPM | OXYGEN SATURATION: 100 % | DIASTOLIC BLOOD PRESSURE: 79 MMHG | WEIGHT: 147 LBS

## 2023-04-17 DIAGNOSIS — R97.20 ELEVATED PSA: ICD-10-CM

## 2023-04-17 DIAGNOSIS — R97.20 ELEVATED PSA: Primary | ICD-10-CM

## 2023-04-17 LAB — PSA SERPL-MCNC: 5.2 NG/ML

## 2023-04-17 PROCEDURE — 3077F SYST BP >= 140 MM HG: CPT | Performed by: UROLOGY

## 2023-04-17 PROCEDURE — 99204 OFFICE O/P NEW MOD 45 MIN: CPT | Performed by: UROLOGY

## 2023-04-17 PROCEDURE — 1123F ACP DISCUSS/DSCN MKR DOCD: CPT | Performed by: UROLOGY

## 2023-04-17 PROCEDURE — 3078F DIAST BP <80 MM HG: CPT | Performed by: UROLOGY

## 2023-04-17 PROCEDURE — 84153 ASSAY OF PSA TOTAL: CPT

## 2023-04-17 PROCEDURE — 36415 COLL VENOUS BLD VENIPUNCTURE: CPT

## 2023-04-17 RX ORDER — TRIAMCINOLONE ACETONIDE 1 MG/G
CREAM TOPICAL 2 TIMES DAILY
COMMUNITY

## 2023-04-17 ASSESSMENT — PATIENT HEALTH QUESTIONNAIRE - PHQ9
2. FEELING DOWN, DEPRESSED OR HOPELESS: 0
SUM OF ALL RESPONSES TO PHQ QUESTIONS 1-9: 0

## 2023-04-17 ASSESSMENT — ENCOUNTER SYMPTOMS
RESPIRATORY NEGATIVE: 1
GASTROINTESTINAL NEGATIVE: 1

## 2023-04-17 NOTE — PATIENT INSTRUCTIONS
Patient Instructions from Today's Visit    Reason for Visit:  New Patient, elevated PSA     Diagnosis Information:  https://www.[employer]/. net/about-us/asco-answers-patient-education-materials/ypkl-fbzltto-lfsl-sheets      Plan:  There are multiple reasons that could cause your PSA to elevated. Trauma, infection and inflammation are the most common. Of course Prostate Cancer is a possibility, however it is not the most common. We are re checking your psa today. While we will work you up for the elevation in the psa, we would like you to focus on the other pressing medical issues first.       Follow Up: If the psa is substantially low, we will cancel the MRI. If it is the same or higher we would like you to have the MRI of the prostate to make sure there is nothing worrisome. Recent Lab Results:  Not resulted as of this note. Treatment Summary has been discussed and given to patient:   N/a      -------------------------------------------------------------------------------------------------------------------    Patient does express an interest in My Chart. My Chart log in information explained on the after visit summary printout at the [address] [facility] [address] desk.     THOMAS Hendricks

## 2023-04-26 ENCOUNTER — TELEPHONE (OUTPATIENT)
Dept: CARDIOLOGY CLINIC | Age: 73
End: 2023-04-26

## 2023-04-26 NOTE — TELEPHONE ENCOUNTER
Spoke with Mcdonald Schwab from [address] [place] [address] and we were able to have her modify the order and have it extended for the alloted 30 days. Pt aware.

## 2023-04-26 NOTE — TELEPHONE ENCOUNTER
Pt states that the monitor is saying the monitoring is complete, so pt is wondering does he need to send it or keep it on til May 5 like he was told.

## 2023-05-01 ENCOUNTER — OFFICE VISIT (OUTPATIENT)
Dept: INTERNAL MEDICINE CLINIC | Facility: CLINIC | Age: 73
End: 2023-05-01
Payer: MEDICARE

## 2023-05-01 VITALS
DIASTOLIC BLOOD PRESSURE: 73 MMHG | SYSTOLIC BLOOD PRESSURE: 145 MMHG | RESPIRATION RATE: 18 BRPM | TEMPERATURE: 98.1 F | HEART RATE: 60 BPM | OXYGEN SATURATION: 99 % | HEIGHT: 66 IN | BODY MASS INDEX: 23.63 KG/M2 | WEIGHT: 147 LBS

## 2023-05-01 DIAGNOSIS — M81.0 AGE-RELATED OSTEOPOROSIS WITHOUT CURRENT PATHOLOGICAL FRACTURE: Primary | ICD-10-CM

## 2023-05-01 DIAGNOSIS — G45.9 TIA (TRANSIENT ISCHEMIC ATTACK): ICD-10-CM

## 2023-05-01 PROCEDURE — 1123F ACP DISCUSS/DSCN MKR DOCD: CPT | Performed by: INTERNAL MEDICINE

## 2023-05-01 PROCEDURE — 99213 OFFICE O/P EST LOW 20 MIN: CPT | Performed by: INTERNAL MEDICINE

## 2023-05-01 PROCEDURE — 3078F DIAST BP <80 MM HG: CPT | Performed by: INTERNAL MEDICINE

## 2023-05-01 PROCEDURE — 3074F SYST BP LT 130 MM HG: CPT | Performed by: INTERNAL MEDICINE

## 2023-05-01 RX ORDER — FAMOTIDINE 20 MG/1
20 TABLET, FILM COATED ORAL DAILY
Qty: 90 TABLET | Refills: 3 | Status: SHIPPED | OUTPATIENT
Start: 2023-05-01

## 2023-05-01 ASSESSMENT — PATIENT HEALTH QUESTIONNAIRE - PHQ9
SUM OF ALL RESPONSES TO PHQ QUESTIONS 1-9: 0
SUM OF ALL RESPONSES TO PHQ QUESTIONS 1-9: 0
SUM OF ALL RESPONSES TO PHQ9 QUESTIONS 1 & 2: 0
SUM OF ALL RESPONSES TO PHQ QUESTIONS 1-9: 0
2. FEELING DOWN, DEPRESSED OR HOPELESS: 0
SUM OF ALL RESPONSES TO PHQ QUESTIONS 1-9: 0
1. LITTLE INTEREST OR PLEASURE IN DOING THINGS: 0

## 2023-05-01 NOTE — PROGRESS NOTES
2023   Location:[facility] [address] [facility]  [place]  Patient #:  [id number]  YOB: 1950        History of Present Illness     Chief Complaint   Patient presents with    Follow-up Chronic Condition     4 week f/u    Discuss Medications     pepcid       Mr. Magdalena Murguia is a 75 y.o. male  who presents for follow after recent TIA. He is seeing cardiology. No recurrent neurological symptoms. Scheduled for Prolia injection at [place] [address] in [place] [address] urologist for recent elevated PSA. His father had prostate cancer but  of other causes.   Last Labs  CBC:   Lab Results   Component Value Date/Time    WBC 5.4 2023 09:11 AM    RBC 3.78 2023 09:11 AM    HGB 12.7 2023 09:11 AM    HCT 35.4 2023 09:11 AM    MCV 93.7 2023 09:11 AM    MCH 33.6 2023 09:11 AM    MCHC 35.9 2023 09:11 AM    RDW 14.9 2023 09:11 AM     2023 09:11 AM    MPV 9.3 2023 09:11 AM     CMP:    Lab Results   Component Value Date/Time     2023 09:31 AM    K 4.0 2023 09:31 AM     2023 09:31 AM    CO2 26 2023 09:31 AM    BUN 8 2023 09:31 AM    CREATININE 1.10 2023 09:31 AM    GFRAA >60 2022 08:05 AM    AGRATIO 1.8 2021 10:25 AM    LABGLOM >60 2023 09:31 AM    LABGLOM 78 2022 10:31 AM    GLUCOSE 104 2023 09:31 AM    PROT 7.4 2023 09:31 AM    LABALBU 3.9 2023 09:31 AM    CALCIUM 8.6 2023 09:31 AM    CALCIUM 8.6 2023 09:31 AM    BILITOT 0.5 2023 09:31 AM    ALKPHOS 63 2023 09:31 AM    ALKPHOS 66 2021 10:25 AM    AST 21 2023 09:31 AM    ALT 29 2023 09:31 AM     HgBA1c:  No results found for: LABA1C  FLP:    Lab Results   Component Value Date/Time    TRIG 77 2023 09:11 AM    HDL 50 2023 09:11 AM    LDLCALC 89.6 2023 09:11 AM    LABVLDL 15.4 2023 09:11 AM     TSH:    Lab Results

## 2023-05-12 ENCOUNTER — HOSPITAL ENCOUNTER (OUTPATIENT)
Dept: MRI IMAGING | Age: 73
Discharge: HOME | End: 2023-05-15

## 2023-05-12 DIAGNOSIS — R97.20 ELEVATED PSA: ICD-10-CM

## 2023-05-15 ENCOUNTER — TELEPHONE (OUTPATIENT)
Dept: ONCOLOGY | Age: 73
End: 2023-05-15

## 2023-05-15 ENCOUNTER — OFFICE VISIT (OUTPATIENT)
Dept: ONCOLOGY | Age: 73
End: 2023-05-15

## 2023-05-15 VITALS
TEMPERATURE: 97.8 F | SYSTOLIC BLOOD PRESSURE: 145 MMHG | OXYGEN SATURATION: 99 % | RESPIRATION RATE: 16 BRPM | WEIGHT: 146 LBS | DIASTOLIC BLOOD PRESSURE: 78 MMHG | HEART RATE: 54 BPM | HEIGHT: 66 IN | BODY MASS INDEX: 23.46 KG/M2

## 2023-05-15 DIAGNOSIS — R97.20 ELEVATED PSA: Primary | ICD-10-CM

## 2023-05-15 RX ORDER — CIPROFLOXACIN 500 MG/1
TABLET, FILM COATED ORAL
Qty: 2 TABLET | Refills: 0 | Status: SHIPPED | OUTPATIENT
Start: 2023-05-15

## 2023-05-15 ASSESSMENT — PATIENT HEALTH QUESTIONNAIRE - PHQ9
3. TROUBLE FALLING OR STAYING ASLEEP: 0
10. IF YOU CHECKED OFF ANY PROBLEMS, HOW DIFFICULT HAVE THESE PROBLEMS MADE IT FOR YOU TO DO YOUR WORK, TAKE CARE OF THINGS AT HOME, OR GET ALONG WITH OTHER PEOPLE: 0
1. LITTLE INTEREST OR PLEASURE IN DOING THINGS: 0
SUM OF ALL RESPONSES TO PHQ9 QUESTIONS 1 & 2: 0
2. FEELING DOWN, DEPRESSED OR HOPELESS: 0
7. TROUBLE CONCENTRATING ON THINGS, SUCH AS READING THE NEWSPAPER OR WATCHING TELEVISION: 0
SUM OF ALL RESPONSES TO PHQ QUESTIONS 1-9: 0
4. FEELING TIRED OR HAVING LITTLE ENERGY: 0
8. MOVING OR SPEAKING SO SLOWLY THAT OTHER PEOPLE COULD HAVE NOTICED. OR THE OPPOSITE, BEING SO FIGETY OR RESTLESS THAT YOU HAVE BEEN MOVING AROUND A LOT MORE THAN USUAL: 0
SUM OF ALL RESPONSES TO PHQ QUESTIONS 1-9: 0
5. POOR APPETITE OR OVEREATING: 0
6. FEELING BAD ABOUT YOURSELF - OR THAT YOU ARE A FAILURE OR HAVE LET YOURSELF OR YOUR FAMILY DOWN: 0
9. THOUGHTS THAT YOU WOULD BE BETTER OFF DEAD, OR OF HURTING YOURSELF: 0
SUM OF ALL RESPONSES TO PHQ QUESTIONS 1-9: 0
SUM OF ALL RESPONSES TO PHQ QUESTIONS 1-9: 0

## 2023-05-15 ASSESSMENT — ANXIETY QUESTIONNAIRES
6. BECOMING EASILY ANNOYED OR IRRITABLE: 0
7. FEELING AFRAID AS IF SOMETHING AWFUL MIGHT HAPPEN: 0
5. BEING SO RESTLESS THAT IT IS HARD TO SIT STILL: 0
IF YOU CHECKED OFF ANY PROBLEMS ON THIS QUESTIONNAIRE, HOW DIFFICULT HAVE THESE PROBLEMS MADE IT FOR YOU TO DO YOUR WORK, TAKE CARE OF THINGS AT HOME, OR GET ALONG WITH OTHER PEOPLE: NOT DIFFICULT AT ALL
3. WORRYING TOO MUCH ABOUT DIFFERENT THINGS: 0
4. TROUBLE RELAXING: 0
GAD7 TOTAL SCORE: 0
2. NOT BEING ABLE TO STOP OR CONTROL WORRYING: 0
1. FEELING NERVOUS, ANXIOUS, OR ON EDGE: 0

## 2023-05-15 ASSESSMENT — ENCOUNTER SYMPTOMS
RESPIRATORY NEGATIVE: 1
GASTROINTESTINAL NEGATIVE: 1

## 2023-05-15 NOTE — TELEPHONE ENCOUNTER
Received call from Geraldine Yepez with [address] that state pt is currently taking Simvastatin & the medication Ciprofloxacin(Cipro) 500mg tablet  which was sent in today drug interacts with Simvastatin that Pt currently takes,  so Geraldine Yepez want to know what can be done.            [facility]: [facility] [address] #[phone]

## 2023-05-15 NOTE — TELEPHONE ENCOUNTER
I reported to Dr Michelle Lawrence and the patient is to take cipro 2 pills for his BX. He does NOT need to hold the simvastatin. Call to Teresa at [facility] and she Luis Fernando Morfin understanding of instructions.

## 2023-05-15 NOTE — PATIENT INSTRUCTIONS
Patient Instructions from Today's Visit    Reason for Visit:  Follow up     Diagnosis Information:  https://www.[employer]/. net/about-us/asco-answers-patient-education-materials/zjcq-ukroosj-cvcs-sheets      Plan:  The mri shows an area of concern. It was given a PI-RADS score of 4. Options would be to move forward with a biopsy or continue to monitor you psa. Since you are meeting with your cardiologist this week, please go ahead and get cardiac clearance, should you decided to move forward with the biopsy. Additional information on the biopsy below. We would like you stop the plavix one week prior to the biopsy. You can stay the aspirin, but if you stop this for surgery, do so a week in advance.     -blood in the urine and stool for the first few days after the biopsy is normal, blood in the ejaculate for up to two months is also normal. We would like you to restart your blood thinners once you do no notice blood in your urine/stool  Follow Up: With the biopsy in 2-3 weeks. Recent Lab Results:  N/a    Treatment Summary has been discussed and given to patient:   N/a    -------------------------------------------------------------------------------------------------------------------    Patient does express an interest in My Chart. My Chart log in information explained on the after visit summary printout at the [address] desk. THOMAS Jimenez        PRE-PROSTATE BIOPSY INFORMATION     The procedure involves an ultrasound probe which will be inserted into your rectum to visualize the prostate. A needle with lidocaine will be injected to numb the prostate gland. Once the gland is numb they will insert a biopsy needle to get multiple samples from your prostate gland. A pathologist will check the samples for cancer. The results from the pathologist can take 10-14 days to be finalized. If you haven't heard back in a reasonable time please call.      Before your biopsy:    -You will be given antibiotics prior

## 2023-05-16 ENCOUNTER — TELEPHONE (OUTPATIENT)
Dept: ONCOLOGY | Age: 73
End: 2023-05-16

## 2023-05-18 ENCOUNTER — OFFICE VISIT (OUTPATIENT)
Age: 73
End: 2023-05-18

## 2023-05-18 VITALS
HEART RATE: 56 BPM | BODY MASS INDEX: 24.83 KG/M2 | HEIGHT: 65 IN | SYSTOLIC BLOOD PRESSURE: 122 MMHG | DIASTOLIC BLOOD PRESSURE: 64 MMHG | WEIGHT: 149 LBS

## 2023-05-18 DIAGNOSIS — G45.9 TIA (TRANSIENT ISCHEMIC ATTACK): Primary | ICD-10-CM

## 2023-05-18 NOTE — PROGRESS NOTES
[facility] CARDIOLOGY  [address], [address]  PHONE: [phone]      23    NAME:  Collins Montemayor  : 1950  MRN: [id number]         SUBJECTIVE:   Collins Montemayor is a 75 y.o. male seen for a follow up visit regarding the following:     Chief Complaint   Patient presents with    Follow-up     6 WEEKS    Results     ECHO AND MONITOR            HPI:  Follow up  Follow-up (6 WEEKS) and Results (ECHO AND MONITOR)   . Mr. Maggie Sigala presents today for follow-up. He was seen 6 weeks ago for recurrent TIA. This was his third episode since 2017. His symptoms at the most recent episode were word finding issues presenting for an echocardiogram as well as a 1 month event monitor. His echo showed no significant abnormality other than a mildly dilated left atrium. He has 1 month monitor showed no evidence of atrial fibrillation. He is otherwise no complaints today. Key CAD CHF Meds            amLODIPine (NORVASC) 2.5 MG tablet (Taking)    Sig: TAKE ONE TABLET BY MOUTH EVERY DAY FOR BLOOD PRESSURE    simvastatin (ZOCOR) 20 MG tablet    Sig: TAKE ONE TABLET BY MOUTH EVERY EVENING FOR CHOLESTEROL    Patient not taking: Reported on 2023          Key Antihyperglycemic Medications       Patient is on no antihyperglycemic meds. Past Medical History, Past Surgical History, Family history, Social History, and Medications were all reviewed with the patient today and updated as necessary. Prior to Admission medications    Medication Sig Start Date End Date Taking? Authorizing Provider   famotidine (PEPCID) 20 MG tablet Take 1 tablet by mouth daily 23  Yes Pablito Rinaldi MD   triamcinolone (KENALOG) 0.1 % cream Apply topically 2 times daily Apply topically 2 times daily.    Yes Historical Provider, MD   Multiple Vitamins-Minerals (MULTIVITAMIN ADULT, MINERALS,) TABS Take 1 tablet by mouth daily   Yes Historical Provider, MD [address]

## 2023-05-19 ENCOUNTER — PREP FOR PROCEDURE (OUTPATIENT)
Dept: ONCOLOGY | Age: 73
End: 2023-05-19

## 2023-05-19 PROBLEM — R97.20 ELEVATED PSA: Status: ACTIVE | Noted: 2023-05-19

## 2023-05-25 ENCOUNTER — HOSPITAL ENCOUNTER (OUTPATIENT)
Age: 73
Setting detail: OUTPATIENT SURGERY
Discharge: HOME | End: 2023-05-25
Attending: INTERNAL MEDICINE | Admitting: INTERNAL MEDICINE
Payer: MEDICARE

## 2023-05-25 VITALS
RESPIRATION RATE: 16 BRPM | WEIGHT: 143 LBS | SYSTOLIC BLOOD PRESSURE: 143 MMHG | TEMPERATURE: 98.3 F | OXYGEN SATURATION: 99 % | DIASTOLIC BLOOD PRESSURE: 81 MMHG | HEART RATE: 63 BPM | BODY MASS INDEX: 23.82 KG/M2 | HEIGHT: 65 IN

## 2023-05-25 DIAGNOSIS — R00.2 PALPITATIONS: ICD-10-CM

## 2023-05-25 LAB
ANION GAP SERPL CALC-SCNC: 5 MMOL/L (ref 2–11)
BUN SERPL-MCNC: 10 MG/DL (ref 8–23)
CALCIUM SERPL-MCNC: 9.1 MG/DL (ref 8.3–10.4)
CHLORIDE SERPL-SCNC: 109 MMOL/L (ref 101–110)
CO2 SERPL-SCNC: 26 MMOL/L (ref 21–32)
CREAT SERPL-MCNC: 1.1 MG/DL (ref 0.8–1.5)
ECHO BSA: 1.72 M2
EKG ATRIAL RATE: 57 BPM
EKG DIAGNOSIS: NORMAL
EKG P AXIS: 40 DEGREES
EKG P-R INTERVAL: 186 MS
EKG Q-T INTERVAL: 412 MS
EKG QRS DURATION: 86 MS
EKG QTC CALCULATION (BAZETT): 401 MS
EKG R AXIS: 59 DEGREES
EKG T AXIS: 11 DEGREES
EKG VENTRICULAR RATE: 57 BPM
ERYTHROCYTE [DISTWIDTH] IN BLOOD BY AUTOMATED COUNT: 14.1 % (ref 11.9–14.6)
GLUCOSE SERPL-MCNC: 103 MG/DL (ref 65–100)
HCT VFR BLD AUTO: 41.7 % (ref 41.1–50.3)
HGB BLD-MCNC: 13.7 G/DL (ref 13.6–17.2)
MCH RBC QN AUTO: 29.7 PG (ref 26.1–32.9)
MCHC RBC AUTO-ENTMCNC: 32.9 G/DL (ref 31.4–35)
MCV RBC AUTO: 90.5 FL (ref 82–102)
NRBC # BLD: 0 K/UL (ref 0–0.2)
PLATELET # BLD AUTO: 174 K/UL (ref 150–450)
PMV BLD AUTO: 8.4 FL (ref 9.4–12.3)
POTASSIUM SERPL-SCNC: 3.9 MMOL/L (ref 3.5–5.1)
RBC # BLD AUTO: 4.61 M/UL (ref 4.23–5.6)
SODIUM SERPL-SCNC: 140 MMOL/L (ref 133–143)
WBC # BLD AUTO: 5.4 K/UL (ref 4.3–11.1)

## 2023-05-25 PROCEDURE — 80048 BASIC METABOLIC PNL TOTAL CA: CPT

## 2023-05-25 PROCEDURE — 93010 ELECTROCARDIOGRAM REPORT: CPT | Performed by: INTERNAL MEDICINE

## 2023-05-25 PROCEDURE — 33285 INSJ SUBQ CAR RHYTHM MNTR: CPT | Performed by: INTERNAL MEDICINE

## 2023-05-25 PROCEDURE — 93005 ELECTROCARDIOGRAM TRACING: CPT | Performed by: INTERNAL MEDICINE

## 2023-05-25 PROCEDURE — 2709999900 HC NON-CHARGEABLE SUPPLY: Performed by: INTERNAL MEDICINE

## 2023-05-25 PROCEDURE — 2500000003 HC RX 250 WO HCPCS: Performed by: INTERNAL MEDICINE

## 2023-05-25 PROCEDURE — C1764 EVENT RECORDER, CARDIAC: HCPCS | Performed by: INTERNAL MEDICINE

## 2023-05-25 PROCEDURE — 85027 COMPLETE CBC AUTOMATED: CPT

## 2023-05-25 RX ORDER — LIDOCAINE HYDROCHLORIDE 10 MG/ML
INJECTION, SOLUTION INFILTRATION; PERINEURAL PRN
Status: DISCONTINUED | OUTPATIENT
Start: 2023-05-25 | End: 2023-05-25 | Stop reason: HOSPADM

## 2023-05-25 RX ORDER — SODIUM CHLORIDE 9 MG/ML
INJECTION, SOLUTION INTRAVENOUS CONTINUOUS
Status: DISCONTINUED | OUTPATIENT
Start: 2023-05-25 | End: 2023-05-31 | Stop reason: HOSPADM

## 2023-06-05 ENCOUNTER — ANESTHESIA EVENT (OUTPATIENT)
Dept: SURGERY | Age: 73
End: 2023-06-05
Payer: MEDICARE

## 2023-06-06 ENCOUNTER — HOSPITAL ENCOUNTER (OUTPATIENT)
Age: 73
Setting detail: OUTPATIENT SURGERY
Discharge: HOME | End: 2023-06-06
Attending: UROLOGY | Admitting: UROLOGY
Payer: MEDICARE

## 2023-06-06 ENCOUNTER — ANESTHESIA (OUTPATIENT)
Dept: SURGERY | Age: 73
End: 2023-06-06
Payer: MEDICARE

## 2023-06-06 VITALS
TEMPERATURE: 97.9 F | OXYGEN SATURATION: 100 % | HEIGHT: 64 IN | SYSTOLIC BLOOD PRESSURE: 122 MMHG | RESPIRATION RATE: 15 BRPM | BODY MASS INDEX: 24.92 KG/M2 | WEIGHT: 146 LBS | HEART RATE: 52 BPM | DIASTOLIC BLOOD PRESSURE: 72 MMHG

## 2023-06-06 DIAGNOSIS — R97.20 ELEVATED PSA: ICD-10-CM

## 2023-06-06 LAB
APPEARANCE UR: CLEAR
BILIRUB UR QL: NEGATIVE
COLOR UR: NORMAL
GLUCOSE UR STRIP.AUTO-MCNC: NEGATIVE MG/DL
HGB UR QL STRIP: NEGATIVE
KETONES UR QL STRIP.AUTO: NEGATIVE MG/DL
LEUKOCYTE ESTERASE UR QL STRIP.AUTO: NEGATIVE
NITRITE UR QL STRIP.AUTO: NEGATIVE
PH UR STRIP: 7 (ref 5–9)
PROT UR STRIP-MCNC: NEGATIVE MG/DL
SP GR UR REFRACTOMETRY: 1.01 (ref 1–1.02)
UROBILINOGEN UR QL STRIP.AUTO: 0.2 EU/DL (ref 0.2–1)

## 2023-06-06 PROCEDURE — 81003 URINALYSIS AUTO W/O SCOPE: CPT

## 2023-06-06 PROCEDURE — 6360000002 HC RX W HCPCS: Performed by: NURSE ANESTHETIST, CERTIFIED REGISTERED

## 2023-06-06 PROCEDURE — 3600000002 HC SURGERY LEVEL 2 BASE: Performed by: UROLOGY

## 2023-06-06 PROCEDURE — 3700000001 HC ADD 15 MINUTES (ANESTHESIA): Performed by: UROLOGY

## 2023-06-06 PROCEDURE — 7100000001 HC PACU RECOVERY - ADDTL 15 MIN: Performed by: UROLOGY

## 2023-06-06 PROCEDURE — 2709999900 HC NON-CHARGEABLE SUPPLY: Performed by: UROLOGY

## 2023-06-06 PROCEDURE — 7100000000 HC PACU RECOVERY - FIRST 15 MIN: Performed by: UROLOGY

## 2023-06-06 PROCEDURE — 2580000003 HC RX 258: Performed by: ANESTHESIOLOGY

## 2023-06-06 PROCEDURE — 6360000002 HC RX W HCPCS: Performed by: UROLOGY

## 2023-06-06 PROCEDURE — 2580000003 HC RX 258: Performed by: UROLOGY

## 2023-06-06 PROCEDURE — 2500000003 HC RX 250 WO HCPCS: Performed by: NURSE ANESTHETIST, CERTIFIED REGISTERED

## 2023-06-06 PROCEDURE — 6360000002 HC RX W HCPCS: Performed by: ANESTHESIOLOGY

## 2023-06-06 PROCEDURE — 3700000000 HC ANESTHESIA ATTENDED CARE: Performed by: UROLOGY

## 2023-06-06 PROCEDURE — 88305 TISSUE EXAM BY PATHOLOGIST: CPT

## 2023-06-06 PROCEDURE — 7100000011 HC PHASE II RECOVERY - ADDTL 15 MIN: Performed by: UROLOGY

## 2023-06-06 PROCEDURE — 7100000010 HC PHASE II RECOVERY - FIRST 15 MIN: Performed by: UROLOGY

## 2023-06-06 PROCEDURE — 3600000012 HC SURGERY LEVEL 2 ADDTL 15MIN: Performed by: UROLOGY

## 2023-06-06 RX ORDER — DIPHENHYDRAMINE HYDROCHLORIDE 50 MG/ML
12.5 INJECTION INTRAMUSCULAR; INTRAVENOUS
Status: DISCONTINUED | OUTPATIENT
Start: 2023-06-06 | End: 2023-06-06 | Stop reason: HOSPADM

## 2023-06-06 RX ORDER — PROPOFOL 10 MG/ML
INJECTION, EMULSION INTRAVENOUS PRN
Status: DISCONTINUED | OUTPATIENT
Start: 2023-06-06 | End: 2023-06-06 | Stop reason: SDUPTHER

## 2023-06-06 RX ORDER — SODIUM CHLORIDE 0.9 % (FLUSH) 0.9 %
5-40 SYRINGE (ML) INJECTION PRN
Status: DISCONTINUED | OUTPATIENT
Start: 2023-06-06 | End: 2023-06-06 | Stop reason: HOSPADM

## 2023-06-06 RX ORDER — SODIUM CHLORIDE, SODIUM LACTATE, POTASSIUM CHLORIDE, CALCIUM CHLORIDE 600; 310; 30; 20 MG/100ML; MG/100ML; MG/100ML; MG/100ML
INJECTION, SOLUTION INTRAVENOUS CONTINUOUS
Status: DISCONTINUED | OUTPATIENT
Start: 2023-06-06 | End: 2023-06-06 | Stop reason: HOSPADM

## 2023-06-06 RX ORDER — HYDROMORPHONE HYDROCHLORIDE 2 MG/ML
0.5 INJECTION, SOLUTION INTRAMUSCULAR; INTRAVENOUS; SUBCUTANEOUS EVERY 5 MIN PRN
Status: DISCONTINUED | OUTPATIENT
Start: 2023-06-06 | End: 2023-06-06 | Stop reason: HOSPADM

## 2023-06-06 RX ORDER — HYDROMORPHONE HYDROCHLORIDE 2 MG/ML
0.25 INJECTION, SOLUTION INTRAMUSCULAR; INTRAVENOUS; SUBCUTANEOUS EVERY 5 MIN PRN
Status: DISCONTINUED | OUTPATIENT
Start: 2023-06-06 | End: 2023-06-06 | Stop reason: HOSPADM

## 2023-06-06 RX ORDER — LIDOCAINE HYDROCHLORIDE 20 MG/ML
INJECTION, SOLUTION EPIDURAL; INFILTRATION; INTRACAUDAL; PERINEURAL PRN
Status: DISCONTINUED | OUTPATIENT
Start: 2023-06-06 | End: 2023-06-06 | Stop reason: SDUPTHER

## 2023-06-06 RX ORDER — PROCHLORPERAZINE EDISYLATE 5 MG/ML
5 INJECTION INTRAMUSCULAR; INTRAVENOUS
Status: DISCONTINUED | OUTPATIENT
Start: 2023-06-06 | End: 2023-06-06 | Stop reason: HOSPADM

## 2023-06-06 RX ORDER — SODIUM CHLORIDE 0.9 % (FLUSH) 0.9 %
5-40 SYRINGE (ML) INJECTION EVERY 12 HOURS SCHEDULED
Status: DISCONTINUED | OUTPATIENT
Start: 2023-06-06 | End: 2023-06-06 | Stop reason: HOSPADM

## 2023-06-06 RX ORDER — LIDOCAINE HYDROCHLORIDE 10 MG/ML
1 INJECTION, SOLUTION INFILTRATION; PERINEURAL
Status: DISCONTINUED | OUTPATIENT
Start: 2023-06-06 | End: 2023-06-06 | Stop reason: HOSPADM

## 2023-06-06 RX ORDER — PROPOFOL 10 MG/ML
INJECTION, EMULSION INTRAVENOUS CONTINUOUS PRN
Status: DISCONTINUED | OUTPATIENT
Start: 2023-06-06 | End: 2023-06-06 | Stop reason: SDUPTHER

## 2023-06-06 RX ORDER — OXYCODONE HYDROCHLORIDE 5 MG/1
10 TABLET ORAL PRN
Status: DISCONTINUED | OUTPATIENT
Start: 2023-06-06 | End: 2023-06-06 | Stop reason: HOSPADM

## 2023-06-06 RX ORDER — MIDAZOLAM HYDROCHLORIDE 2 MG/2ML
2 INJECTION, SOLUTION INTRAMUSCULAR; INTRAVENOUS
Status: COMPLETED | OUTPATIENT
Start: 2023-06-06 | End: 2023-06-06

## 2023-06-06 RX ORDER — ONDANSETRON 2 MG/ML
4 INJECTION INTRAMUSCULAR; INTRAVENOUS
Status: DISCONTINUED | OUTPATIENT
Start: 2023-06-06 | End: 2023-06-06 | Stop reason: HOSPADM

## 2023-06-06 RX ORDER — OXYCODONE HYDROCHLORIDE 5 MG/1
5 TABLET ORAL PRN
Status: DISCONTINUED | OUTPATIENT
Start: 2023-06-06 | End: 2023-06-06 | Stop reason: HOSPADM

## 2023-06-06 RX ORDER — SODIUM CHLORIDE 9 MG/ML
INJECTION, SOLUTION INTRAVENOUS PRN
Status: DISCONTINUED | OUTPATIENT
Start: 2023-06-06 | End: 2023-06-06 | Stop reason: HOSPADM

## 2023-06-06 RX ADMIN — FENTANYL CITRATE 50 MCG: 50 INJECTION INTRAMUSCULAR; INTRAVENOUS at 11:55

## 2023-06-06 RX ADMIN — MIDAZOLAM HYDROCHLORIDE 1 MG: 1 INJECTION, SOLUTION INTRAMUSCULAR; INTRAVENOUS at 11:51

## 2023-06-06 RX ADMIN — SODIUM CHLORIDE, POTASSIUM CHLORIDE, SODIUM LACTATE AND CALCIUM CHLORIDE: 600; 310; 30; 20 INJECTION, SOLUTION INTRAVENOUS at 11:11

## 2023-06-06 RX ADMIN — CEFTRIAXONE SODIUM 2000 MG: 2 INJECTION, POWDER, FOR SOLUTION INTRAMUSCULAR; INTRAVENOUS at 11:51

## 2023-06-06 RX ADMIN — LIDOCAINE HYDROCHLORIDE 100 MG: 20 INJECTION, SOLUTION EPIDURAL; INFILTRATION; INTRACAUDAL; PERINEURAL at 12:02

## 2023-06-06 RX ADMIN — PROPOFOL 50 MG: 10 INJECTION, EMULSION INTRAVENOUS at 12:02

## 2023-06-06 RX ADMIN — MIDAZOLAM HYDROCHLORIDE 1 MG: 1 INJECTION, SOLUTION INTRAMUSCULAR; INTRAVENOUS at 11:55

## 2023-06-06 RX ADMIN — PROPOFOL 100 MCG/KG/MIN: 10 INJECTION, EMULSION INTRAVENOUS at 12:02

## 2023-06-06 ASSESSMENT — PAIN - FUNCTIONAL ASSESSMENT: PAIN_FUNCTIONAL_ASSESSMENT: 0-10

## 2023-06-06 NOTE — OP NOTE
Operative Note        Patient: Chano Mccain [id number]    Date of Surgery: 06/06/23    Preoperative Diagnosis: Elevated PSA    Postoperative Diagnosis:  same    Surgeon(s) and Role:     * Eduardo Lawrence MD - Primary     Anesthesia:  MAC     Procedure: Procedure(s):  Maru Lee MRI fused TRUS prostate biopsy     Indications:     Discussed the risk of surgery including infection, hematoma, bleeding, urinary retention, and the risks of anesthethesia. The patient understands the risks, any and all questions were answered to the patient's satisfaction and signed the consent for operation. URONAV MRI FUSED TRANSRECTAL ULTRASOUND GUIDED BIOPSY OF THE PROSTATE    All risks, benefits and alternatives were again reviewed and he is willing to proceed at this time. The patient was placed in the left lateral decubitus position. Rocephin was given as a prophylactic antibiotic. A betadine swab was used to prep the anus/rectum. I then inserted the transrectal ultrasound probe into the rectum. The prostate was visualized. The prostate appeared homogenous in appearance. Ultrasonographic sweep of the prostate was performed to obtain images to link to MRI via URONAV. There were 2 regions of interest based upon MRI imaging. 3 biopsies of regions of interest were then obtained using the ultrasound images for guidance that had been linked to the previous MRI using Uronav. I then performed 12 needle core biopsies using a standard sextant biopsy format with traditional ultrasound images for guidance. The ultrasound probe was removed. The patient tolerated the procedure well. PROSTATE VOLUME:  31 cc    KALIE: No induration or nodule     Estimated Blood Loss:  minimal    Specimens: prostate biopsies             Drains: none                 Implants: * No implants in log *    Tea Mckinley M.D.     [facility]  [address] [place] [address]

## 2023-06-06 NOTE — ANESTHESIA PRE PROCEDURE
Department of Anesthesiology  Preprocedure Note       Name:  Juanjose Shaikh   Age:  75 y.o.  :  1950                                          MRN:  [id number]         Date:  2023      Surgeon: Dorys Cannon): Trena Barrett MD    Procedure: Procedure(s):  MRI FUSION PROSTATE BIOPSY    Medications prior to admission:   Prior to Admission medications    Medication Sig Start Date End Date Taking? Authorizing Provider   ciprofloxacin (CIPRO) 500 MG tablet Please take the first tablet the morning of the procedure. Take the second tablet after the procedure prior to going to bed. 5/15/23   Trena Barrett MD   famotidine (PEPCID) 20 MG tablet Take 1 tablet by mouth daily 23   Gini Palomares MD   triamcinolone (KENALOG) 0.1 % cream Apply topically 2 times daily Apply topically 2 times daily.     Historical Provider, MD   Multiple Vitamins-Minerals (MULTIVITAMIN ADULT, MINERALS,) TABS Take 1 tablet by mouth daily    Historical Provider, MD   PROLIA 60 MG/ML SOSY SC injection Inject 1 mL into the skin every 6 months 23   Yolanda Ivy MD   clopidogrel (PLAVIX) 75 MG tablet Take 1 tablet by mouth daily 4/3/23   Gini Palomares MD   calcium carbonate (CALCIUM 600) 600 MG TABS tablet Take 1 tablet by mouth 2 times daily 3/21/23   Gini Palomares MD   Vitamin D, Cholecalciferol, 50 MCG (2000) CAPS Take 1 capsule by mouth daily 3/21/23   Gini Palomares MD   simvastatin (ZOCOR) 20 MG tablet TAKE ONE TABLET BY MOUTH EVERY EVENING FOR CHOLESTEROL  Patient taking differently: Take 1 tablet by mouth nightly TAKE ONE TABLET BY MOUTH EVERY EVENING FOR CHOLESTEROL 1/10/23   Gini Palomares MD   amLODIPine (NORVASC) 2.5 MG tablet TAKE ONE TABLET BY MOUTH EVERY DAY FOR BLOOD PRESSURE 1/10/23   Gini Palomares MD   cetirizine (ZYRTEC) 10 MG tablet Take 1 tablet by mouth daily    Historical Provider, MD   aspirin 81 MG EC tablet Take 1 tablet by mouth daily 3/21/17   Ar Automatic Reconciliation

## 2023-06-06 NOTE — ANESTHESIA POSTPROCEDURE EVALUATION
Department of Anesthesiology  Postprocedure Note    Patient: Hamilton Mckenzie  MRN: [id number]  YOB: 1950  Date of evaluation: 6/6/2023      Procedure Summary     Date: 06/06/23 Room / Location: [facility] MAIN OR  / [facility] MAIN OR    Anesthesia Start: 6624 Anesthesia Stop: 1226    Procedure: MRI FUSION PROSTATE BIOPSY (Rectum) Diagnosis:       Elevated PSA      (Elevated PSA [R97.20])    Providers: Albaro Ross MD Responsible Provider: Jose Enrique Mishra MD    Anesthesia Type: TIVA ASA Status: 3          Anesthesia Type: No value filed.     William Phase I: William Score: 10    William Phase II: William Score: 10      Anesthesia Post Evaluation    Patient location during evaluation: PACU  Patient participation: complete - patient participated  Level of consciousness: awake and alert  Airway patency: patent  Nausea & Vomiting: no nausea and no vomiting  Complications: no  Cardiovascular status: hemodynamically stable  Respiratory status: acceptable, nonlabored ventilation and spontaneous ventilation  Hydration status: euvolemic  Comments: /72   Pulse 52   Temp 97.9 °F (36.6 °C) (Temporal)   Resp 15   Ht 5' 4\" (1.626 m)   Wt 146 lb (66.2 kg)   SpO2 100%   BMI 25.06 kg/m²     Multimodal analgesia pain management approach

## 2023-06-06 NOTE — DISCHARGE INSTRUCTIONS
My office will schedule your follow-up appointment. Please call our office ([phone]) or return to ED if you experience fever > 101.5, severe pain, persistent nausea/vomiting, excessive bleeding, inability to urinate, or other concerns. Please take your cipro tablet tonight. You may restart your plavix in 3 days if no bleeding in urine or bowels. If you have had surgery in the past 7-10 days by one of our providers and are having fever, bleeding, or drainage from an incision, have an opening in an incision, or having issues urinating properly, please call [phone]. Prostate Biopsy and Ultrasound:   A prostate biopsy is a type of test. Your doctor takes small tissue samples from your prostate gland. Then another doctor looks at the tissue under a microscope to see if there are cancer cells. This test is done by a doctor who specializes in men's genital and urinary problems (urologist). It can be done in your doctor's office, a day surgery clinic, or a hospital operating room. To get the tissue samples from the prostate, the doctor inserts a thin needle through the rectum, the urethra, or the area between the anus and scrotum (perineum). The most common method is through the rectum. Your doctor may use ultrasound to help guide the needle. What else should you know about this test?  A prostate biopsy has a slight risk of causing problems such as infection or bleeding. If the biopsy went through your rectum, you may have a small amount of bleeding from your rectum for 2 to 3 days after the biopsy. You may have a little pain in your pelvic area. You may also have a little blood in your urine for 1 to 5 days. You may have some blood in your semen for a week or longer. Do not do heavy work or exercise for 4 hours after the test.  Your doctor will tell you how long it may take to get your results back. Follow-up care is a key part of your treatment and safety.  Be sure to make and go to all

## 2023-06-08 ENCOUNTER — TELEPHONE (OUTPATIENT)
Dept: ONCOLOGY | Age: 73
End: 2023-06-08

## 2023-06-08 NOTE — TELEPHONE ENCOUNTER
Call to pt. Pt states he is doing well, urine is clearing up and has plans to re start plavix in the next day or so. Reviewed follow up ov date/time and encouraged call should new issues arise.  Pt VU

## 2023-06-21 ENCOUNTER — TELEPHONE (OUTPATIENT)
Age: 73
End: 2023-06-21

## 2023-06-22 ENCOUNTER — HOSPITAL ENCOUNTER (OUTPATIENT)
Dept: NUCLEAR MEDICINE | Age: 73
Discharge: HOME | End: 2023-06-25
Attending: UROLOGY

## 2023-06-22 ENCOUNTER — HOSPITAL ENCOUNTER (OUTPATIENT)
Dept: NUCLEAR MEDICINE | Age: 73
Discharge: HOME | End: 2023-06-22
Attending: UROLOGY
Payer: MEDICARE

## 2023-06-22 DIAGNOSIS — C61 PROSTATE CANCER (HCC): ICD-10-CM

## 2023-06-22 PROCEDURE — 78306 BONE IMAGING WHOLE BODY: CPT | Performed by: UROLOGY

## 2023-06-22 PROCEDURE — 3430000000 HC RX DIAGNOSTIC RADIOPHARMACEUTICAL: Performed by: UROLOGY

## 2023-06-22 PROCEDURE — A9503 TC99M MEDRONATE: HCPCS | Performed by: UROLOGY

## 2023-06-22 RX ORDER — TC 99M MEDRONATE 20 MG/10ML
25.3 INJECTION, POWDER, LYOPHILIZED, FOR SOLUTION INTRAVENOUS
Status: COMPLETED | OUTPATIENT
Start: 2023-06-22 | End: 2023-06-22

## 2023-06-22 RX ADMIN — TC 99M MEDRONATE 25.3 MILLICURIE: 20 INJECTION, POWDER, LYOPHILIZED, FOR SOLUTION INTRAVENOUS at 07:58

## 2023-06-27 ENCOUNTER — HOSPITAL ENCOUNTER (OUTPATIENT)
Dept: RADIATION ONCOLOGY | Age: 73
Setting detail: RECURRING SERIES
Discharge: HOME | End: 2023-06-30
Payer: MEDICARE

## 2023-06-27 VITALS
HEART RATE: 57 BPM | DIASTOLIC BLOOD PRESSURE: 94 MMHG | WEIGHT: 149 LBS | TEMPERATURE: 98.1 F | BODY MASS INDEX: 24.42 KG/M2 | OXYGEN SATURATION: 97 % | SYSTOLIC BLOOD PRESSURE: 152 MMHG

## 2023-06-27 PROCEDURE — 99211 OFF/OP EST MAY X REQ PHY/QHP: CPT

## 2023-07-07 ENCOUNTER — PREP FOR PROCEDURE (OUTPATIENT)
Dept: ONCOLOGY | Age: 73
End: 2023-07-07

## 2023-07-07 ENCOUNTER — OFFICE VISIT (OUTPATIENT)
Dept: ONCOLOGY | Age: 73
End: 2023-07-07
Payer: MEDICARE

## 2023-07-07 VITALS
BODY MASS INDEX: 23.63 KG/M2 | DIASTOLIC BLOOD PRESSURE: 80 MMHG | OXYGEN SATURATION: 97 % | WEIGHT: 147 LBS | HEIGHT: 66 IN | RESPIRATION RATE: 16 BRPM | TEMPERATURE: 98.5 F | HEART RATE: 59 BPM | SYSTOLIC BLOOD PRESSURE: 140 MMHG

## 2023-07-07 DIAGNOSIS — C61 PROSTATE CANCER (HCC): Primary | ICD-10-CM

## 2023-07-07 PROCEDURE — 3077F SYST BP >= 140 MM HG: CPT | Performed by: UROLOGY

## 2023-07-07 PROCEDURE — 99214 OFFICE O/P EST MOD 30 MIN: CPT | Performed by: UROLOGY

## 2023-07-07 PROCEDURE — 1123F ACP DISCUSS/DSCN MKR DOCD: CPT | Performed by: UROLOGY

## 2023-07-07 PROCEDURE — 3079F DIAST BP 80-89 MM HG: CPT | Performed by: UROLOGY

## 2023-07-07 ASSESSMENT — PATIENT HEALTH QUESTIONNAIRE - PHQ9
2. FEELING DOWN, DEPRESSED OR HOPELESS: 0
SUM OF ALL RESPONSES TO PHQ QUESTIONS 1-9: 0
SUM OF ALL RESPONSES TO PHQ9 QUESTIONS 1 & 2: 0
SUM OF ALL RESPONSES TO PHQ QUESTIONS 1-9: 0
1. LITTLE INTEREST OR PLEASURE IN DOING THINGS: 0

## 2023-07-07 ASSESSMENT — ENCOUNTER SYMPTOMS
GASTROINTESTINAL NEGATIVE: 1
RESPIRATORY NEGATIVE: 1

## 2023-07-07 NOTE — PATIENT INSTRUCTIONS
flow..  This may require dilatation which is done by an experienced urologist. Dilatation stretches the area of scarring and is often sufficient to take care of the situation. Drains: If you are dismissed home with a drain, drainage will go through the tube into the plastic lemon-sized bulb. Do not be concerned if there is minor leakage around the drain tube at the skin level. However, keep the skin around the tube clean and dry. If the drain appears to be clogged and all the drainage is coming out at the skin level with no drainage in the bulb, action may be necessary. Try milking the tubing with opposed thumb and forefinger pressure. Drain output will vary. Do not be alarmed by day-to-day fluctuation in the amount of drainage. Keep a daily record of the amount from each drain. Your physician will tell you when it is appropriate to remove the drain. After the drain is removed you might notice some drainage from the drain site. This should be clear or pale yellow in color. Keep this area clean and dry washing with warm water and patting dry with a clean towel. Cover the incision site with dry gauze and change out the gauze as needed to keep the area clean and dry. If you notice that it has a foul odor or has green pus like drainage please call the nursing line as this can be indicative of infection. Note:  If at any time during the convalescence at home, you note leg pain or tenderness, this may be the first sign of a blood clot. If such symptoms occur, please visit your local physician or emergency room for evaluation and possible treatment. Sexual function:  Sexual function is a very sensitive subject. The nerves that provide erections hug your prostate, so operations on the prostate can produce changes in sexual function. The degree of recovery of erectile function after radical prostatectomy is quite variable in terms of time.    If you have a small amount of low grade cancer, your

## 2023-07-11 ENCOUNTER — HOSPITAL ENCOUNTER (OUTPATIENT)
Dept: SURGERY | Age: 73
Discharge: HOME | End: 2023-07-14
Payer: MEDICARE

## 2023-07-11 VITALS
DIASTOLIC BLOOD PRESSURE: 79 MMHG | OXYGEN SATURATION: 100 % | HEIGHT: 66 IN | RESPIRATION RATE: 16 BRPM | BODY MASS INDEX: 23.66 KG/M2 | TEMPERATURE: 97.3 F | HEART RATE: 54 BPM | WEIGHT: 147.2 LBS | SYSTOLIC BLOOD PRESSURE: 134 MMHG

## 2023-07-11 LAB
ANION GAP SERPL CALC-SCNC: 4 MMOL/L (ref 2–11)
BUN SERPL-MCNC: 9 MG/DL (ref 8–23)
CALCIUM SERPL-MCNC: 8.9 MG/DL (ref 8.3–10.4)
CHLORIDE SERPL-SCNC: 111 MMOL/L (ref 101–110)
CO2 SERPL-SCNC: 25 MMOL/L (ref 21–32)
CREAT SERPL-MCNC: 0.79 MG/DL (ref 0.8–1.5)
ERYTHROCYTE [DISTWIDTH] IN BLOOD BY AUTOMATED COUNT: 13.8 % (ref 11.9–14.6)
GLUCOSE SERPL-MCNC: 97 MG/DL (ref 65–100)
HCT VFR BLD AUTO: 41.7 % (ref 41.1–50.3)
HGB BLD-MCNC: 13.6 G/DL (ref 13.6–17.2)
MCH RBC QN AUTO: 29.8 PG (ref 26.1–32.9)
MCHC RBC AUTO-ENTMCNC: 32.6 G/DL (ref 31.4–35)
MCV RBC AUTO: 91.2 FL (ref 82–102)
NRBC # BLD: 0 K/UL (ref 0–0.2)
PLATELET # BLD AUTO: 176 K/UL (ref 150–450)
PMV BLD AUTO: 9 FL (ref 9.4–12.3)
POTASSIUM SERPL-SCNC: 4.4 MMOL/L (ref 3.5–5.1)
RBC # BLD AUTO: 4.57 M/UL (ref 4.23–5.6)
SODIUM SERPL-SCNC: 140 MMOL/L (ref 133–143)
WBC # BLD AUTO: 5.6 K/UL (ref 4.3–11.1)

## 2023-07-11 PROCEDURE — 80048 BASIC METABOLIC PNL TOTAL CA: CPT

## 2023-07-11 PROCEDURE — 85027 COMPLETE CBC AUTOMATED: CPT

## 2023-07-11 RX ORDER — FAMOTIDINE 20 MG/1
20 TABLET, FILM COATED ORAL
COMMUNITY

## 2023-07-11 RX ORDER — SIMVASTATIN 20 MG
20 TABLET ORAL NIGHTLY
COMMUNITY

## 2023-07-11 NOTE — PERIOP NOTE
PLEASE CONTINUE TAKING ALL PRESCRIPTION MEDICATIONS UP TO THE DAY OF SURGERY UNLESS OTHERWISE DIRECTED BELOW. DISCONTINUE all vitamins and supplements 7 days prior to surgery. DISCONTINUE Non-Steriodal Anti-Inflammatory (NSAIDS) such as Advil, ibuprofen, Excedrin, Motrin, and Aleve 5 days prior to surgery. Home Medications to take  the day of surgery     Amlodipine (Norvasc)               Home Medications   to Hold   Vitamin D  Calcium   Multivitamin  Clopidogrel (Plavix)- hold as instructed     Comments     GOKUL-HEX shower the night before surgery and the morning of surgery. On the day before surgery please take Acetaminophen 1000mg in the morning and then again before bed. You may substitute for Tylenol 650 mg. Please do not bring home medications with you on the day of surgery unless otherwise directed by your nurse. If you are instructed to bring home medications, please give them to your nurse as they will be administered by the nursing staff. If you have any questions, please call [address] [place] [address] [phone]. A copy of this note was provided to the patient for reference.

## 2023-07-11 NOTE — PERIOP NOTE
Patient verified name and     Order for consent not found in EHR; patient verified. Type 3 surgery, Walk in assessment complete. Labs per surgeon: none;   Labs per anesthesia protocol: CBC, BMP, T/S DOS; results pending  EKG: EKG 23  Plavix clearance 23    Chart flagged to have anesthesia review- pt last TIA 2023- pt started on Plavix and Aspirin. Pt holding Plavix prior to surgery. Loop recorder placed 23. Pt a low cardiac risk with clearance from Dr Eloy Wade to hold Plavix and Aspirin prior to surgery. Per Dr Diana Morales office visit on - pt can remain on Aspirin. Hospital approved surgical skin cleanser and instructions given per hospital policy. Patient provided with and instructed on educational handouts including Guide to Surgery, Pain Management, Hand Hygiene, Blood Transfusion Education, and [facility] Brochure. Patient answered medical/surgical history questions at their best of ability. All prior to admission medications documented in [facility]. Original medication prescription bottle were not visualized during patient appointment. Patient instructed to hold all vitamins 7 days prior to surgery and NSAIDS 5 days prior to surgery, patient verbalized understanding. Patient teach back successful and patient demonstrates knowledge of instructions.

## 2023-07-17 DIAGNOSIS — C61 PROSTATE CANCER (HCC): Primary | ICD-10-CM

## 2023-07-18 ENCOUNTER — HOSPITAL ENCOUNTER (OUTPATIENT)
Age: 73
Setting detail: OBSERVATION
Discharge: HOME | End: 2023-07-19
Attending: UROLOGY | Admitting: UROLOGY
Payer: MEDICARE

## 2023-07-18 ENCOUNTER — ANESTHESIA EVENT (OUTPATIENT)
Dept: SURGERY | Age: 73
End: 2023-07-18
Payer: MEDICARE

## 2023-07-18 ENCOUNTER — ANESTHESIA (OUTPATIENT)
Dept: SURGERY | Age: 73
End: 2023-07-18
Payer: MEDICARE

## 2023-07-18 DIAGNOSIS — C61 PROSTATE CANCER (HCC): ICD-10-CM

## 2023-07-18 LAB
ABO + RH BLD: NORMAL
BLOOD GROUP ANTIBODIES SERPL: NORMAL
SPECIMEN EXP DATE BLD: NORMAL

## 2023-07-18 PROCEDURE — 2720000010 HC SURG SUPPLY STERILE: Performed by: UROLOGY

## 2023-07-18 PROCEDURE — 2580000003 HC RX 258: Performed by: ANESTHESIOLOGY

## 2023-07-18 PROCEDURE — 2580000003 HC RX 258: Performed by: UROLOGY

## 2023-07-18 PROCEDURE — 6360000002 HC RX W HCPCS: Performed by: ANESTHESIOLOGY

## 2023-07-18 PROCEDURE — 86901 BLOOD TYPING SEROLOGIC RH(D): CPT

## 2023-07-18 PROCEDURE — 3700000000 HC ANESTHESIA ATTENDED CARE: Performed by: UROLOGY

## 2023-07-18 PROCEDURE — 7100000000 HC PACU RECOVERY - FIRST 15 MIN: Performed by: UROLOGY

## 2023-07-18 PROCEDURE — 3600000009 HC SURGERY ROBOT BASE: Performed by: UROLOGY

## 2023-07-18 PROCEDURE — 88304 TISSUE EXAM BY PATHOLOGIST: CPT

## 2023-07-18 PROCEDURE — 88309 TISSUE EXAM BY PATHOLOGIST: CPT

## 2023-07-18 PROCEDURE — 7100000001 HC PACU RECOVERY - ADDTL 15 MIN: Performed by: UROLOGY

## 2023-07-18 PROCEDURE — 3700000001 HC ADD 15 MINUTES (ANESTHESIA): Performed by: UROLOGY

## 2023-07-18 PROCEDURE — G0378 HOSPITAL OBSERVATION PER HR: HCPCS

## 2023-07-18 PROCEDURE — 6370000000 HC RX 637 (ALT 250 FOR IP): Performed by: ANESTHESIOLOGY

## 2023-07-18 PROCEDURE — 88307 TISSUE EXAM BY PATHOLOGIST: CPT

## 2023-07-18 PROCEDURE — 38571 LAPAROSCOPY LYMPHADENECTOMY: CPT | Performed by: UROLOGY

## 2023-07-18 PROCEDURE — 96360 HYDRATION IV INFUSION INIT: CPT

## 2023-07-18 PROCEDURE — 88305 TISSUE EXAM BY PATHOLOGIST: CPT

## 2023-07-18 PROCEDURE — 6360000002 HC RX W HCPCS: Performed by: NURSE ANESTHETIST, CERTIFIED REGISTERED

## 2023-07-18 PROCEDURE — 6370000000 HC RX 637 (ALT 250 FOR IP): Performed by: UROLOGY

## 2023-07-18 PROCEDURE — 2709999900 HC NON-CHARGEABLE SUPPLY: Performed by: UROLOGY

## 2023-07-18 PROCEDURE — 86850 RBC ANTIBODY SCREEN: CPT

## 2023-07-18 PROCEDURE — 6360000002 HC RX W HCPCS: Performed by: UROLOGY

## 2023-07-18 PROCEDURE — 55866 LAPS SURG PRST8ECT RPBIC RAD: CPT | Performed by: UROLOGY

## 2023-07-18 PROCEDURE — 2500000003 HC RX 250 WO HCPCS

## 2023-07-18 PROCEDURE — 3600000019 HC SURGERY ROBOT ADDTL 15MIN: Performed by: UROLOGY

## 2023-07-18 PROCEDURE — 6360000002 HC RX W HCPCS

## 2023-07-18 PROCEDURE — 86900 BLOOD TYPING SEROLOGIC ABO: CPT

## 2023-07-18 PROCEDURE — S2900 ROBOTIC SURGICAL SYSTEM: HCPCS | Performed by: UROLOGY

## 2023-07-18 RX ORDER — GLYCOPYRROLATE 0.2 MG/ML
INJECTION INTRAMUSCULAR; INTRAVENOUS PRN
Status: DISCONTINUED | OUTPATIENT
Start: 2023-07-18 | End: 2023-07-18 | Stop reason: SDUPTHER

## 2023-07-18 RX ORDER — ASPIRIN 81 MG/1
81 TABLET ORAL
Status: DISCONTINUED | OUTPATIENT
Start: 2023-07-18 | End: 2023-07-19 | Stop reason: HOSPADM

## 2023-07-18 RX ORDER — OXYCODONE HYDROCHLORIDE 5 MG/1
TABLET ORAL
Status: DISPENSED
Start: 2023-07-18 | End: 2023-07-19

## 2023-07-18 RX ORDER — FAMOTIDINE 20 MG/1
20 TABLET, FILM COATED ORAL
Status: DISCONTINUED | OUTPATIENT
Start: 2023-07-19 | End: 2023-07-18

## 2023-07-18 RX ORDER — ONDANSETRON 2 MG/ML
INJECTION INTRAMUSCULAR; INTRAVENOUS PRN
Status: DISCONTINUED | OUTPATIENT
Start: 2023-07-18 | End: 2023-07-18 | Stop reason: SDUPTHER

## 2023-07-18 RX ORDER — ACETAMINOPHEN 500 MG
1000 TABLET ORAL ONCE
Status: COMPLETED | OUTPATIENT
Start: 2023-07-18 | End: 2023-07-18

## 2023-07-18 RX ORDER — SODIUM CHLORIDE, SODIUM LACTATE, POTASSIUM CHLORIDE, CALCIUM CHLORIDE 600; 310; 30; 20 MG/100ML; MG/100ML; MG/100ML; MG/100ML
INJECTION, SOLUTION INTRAVENOUS CONTINUOUS
Status: DISCONTINUED | OUTPATIENT
Start: 2023-07-18 | End: 2023-07-19

## 2023-07-18 RX ORDER — HALOPERIDOL 5 MG/ML
1 INJECTION INTRAMUSCULAR
Status: DISCONTINUED | OUTPATIENT
Start: 2023-07-18 | End: 2023-07-18 | Stop reason: HOSPADM

## 2023-07-18 RX ORDER — DEXAMETHASONE SODIUM PHOSPHATE 4 MG/ML
INJECTION, SOLUTION INTRA-ARTICULAR; INTRALESIONAL; INTRAMUSCULAR; INTRAVENOUS; SOFT TISSUE PRN
Status: DISCONTINUED | OUTPATIENT
Start: 2023-07-18 | End: 2023-07-18 | Stop reason: SDUPTHER

## 2023-07-18 RX ORDER — AMLODIPINE BESYLATE 5 MG/1
2.5 TABLET ORAL DAILY
Status: DISCONTINUED | OUTPATIENT
Start: 2023-07-19 | End: 2023-07-19 | Stop reason: HOSPADM

## 2023-07-18 RX ORDER — LIDOCAINE HYDROCHLORIDE 20 MG/ML
INJECTION, SOLUTION EPIDURAL; INFILTRATION; INTRACAUDAL; PERINEURAL PRN
Status: DISCONTINUED | OUTPATIENT
Start: 2023-07-18 | End: 2023-07-18 | Stop reason: SDUPTHER

## 2023-07-18 RX ORDER — ROCURONIUM BROMIDE 10 MG/ML
INJECTION, SOLUTION INTRAVENOUS PRN
Status: DISCONTINUED | OUTPATIENT
Start: 2023-07-18 | End: 2023-07-18 | Stop reason: SDUPTHER

## 2023-07-18 RX ORDER — SODIUM CHLORIDE 0.9 % (FLUSH) 0.9 %
5-40 SYRINGE (ML) INJECTION PRN
Status: DISCONTINUED | OUTPATIENT
Start: 2023-07-18 | End: 2023-07-19 | Stop reason: HOSPADM

## 2023-07-18 RX ORDER — PROPOFOL 10 MG/ML
INJECTION, EMULSION INTRAVENOUS PRN
Status: DISCONTINUED | OUTPATIENT
Start: 2023-07-18 | End: 2023-07-18 | Stop reason: SDUPTHER

## 2023-07-18 RX ORDER — FAMOTIDINE 20 MG/1
TABLET, FILM COATED ORAL
Status: DISPENSED
Start: 2023-07-18 | End: 2023-07-19

## 2023-07-18 RX ORDER — EPHEDRINE SULFATE/0.9% NACL/PF 50 MG/5 ML
SYRINGE (ML) INTRAVENOUS PRN
Status: DISCONTINUED | OUTPATIENT
Start: 2023-07-18 | End: 2023-07-18 | Stop reason: SDUPTHER

## 2023-07-18 RX ORDER — HYDROMORPHONE HYDROCHLORIDE 1 MG/ML
1 INJECTION, SOLUTION INTRAMUSCULAR; INTRAVENOUS; SUBCUTANEOUS EVERY 4 HOURS PRN
Status: DISCONTINUED | OUTPATIENT
Start: 2023-07-18 | End: 2023-07-19 | Stop reason: HOSPADM

## 2023-07-18 RX ORDER — SODIUM CHLORIDE 9 MG/ML
INJECTION, SOLUTION INTRAVENOUS PRN
Status: DISCONTINUED | OUTPATIENT
Start: 2023-07-18 | End: 2023-07-18 | Stop reason: HOSPADM

## 2023-07-18 RX ORDER — OXYCODONE HYDROCHLORIDE 5 MG/1
5 TABLET ORAL
Status: COMPLETED | OUTPATIENT
Start: 2023-07-18 | End: 2023-07-18

## 2023-07-18 RX ORDER — SODIUM CHLORIDE, SODIUM LACTATE, POTASSIUM CHLORIDE, CALCIUM CHLORIDE 600; 310; 30; 20 MG/100ML; MG/100ML; MG/100ML; MG/100ML
INJECTION, SOLUTION INTRAVENOUS CONTINUOUS
Status: DISCONTINUED | OUTPATIENT
Start: 2023-07-18 | End: 2023-07-18 | Stop reason: HOSPADM

## 2023-07-18 RX ORDER — OXYCODONE HYDROCHLORIDE 5 MG/1
5 TABLET ORAL EVERY 4 HOURS PRN
Status: DISCONTINUED | OUTPATIENT
Start: 2023-07-18 | End: 2023-07-19 | Stop reason: HOSPADM

## 2023-07-18 RX ORDER — SENNA AND DOCUSATE SODIUM 50; 8.6 MG/1; MG/1
1 TABLET, FILM COATED ORAL 2 TIMES DAILY
Status: DISCONTINUED | OUTPATIENT
Start: 2023-07-18 | End: 2023-07-19 | Stop reason: HOSPADM

## 2023-07-18 RX ORDER — MIDAZOLAM HYDROCHLORIDE 2 MG/2ML
2 INJECTION, SOLUTION INTRAMUSCULAR; INTRAVENOUS
Status: DISCONTINUED | OUTPATIENT
Start: 2023-07-18 | End: 2023-07-18 | Stop reason: HOSPADM

## 2023-07-18 RX ORDER — SODIUM CHLORIDE 0.9 % (FLUSH) 0.9 %
5-40 SYRINGE (ML) INJECTION PRN
Status: DISCONTINUED | OUTPATIENT
Start: 2023-07-18 | End: 2023-07-18 | Stop reason: HOSPADM

## 2023-07-18 RX ORDER — FAMOTIDINE 20 MG/1
20 TABLET, FILM COATED ORAL
Status: DISCONTINUED | OUTPATIENT
Start: 2023-07-18 | End: 2023-07-19 | Stop reason: HOSPADM

## 2023-07-18 RX ORDER — SODIUM CHLORIDE 0.9 % (FLUSH) 0.9 %
5-40 SYRINGE (ML) INJECTION EVERY 12 HOURS SCHEDULED
Status: DISCONTINUED | OUTPATIENT
Start: 2023-07-18 | End: 2023-07-19 | Stop reason: HOSPADM

## 2023-07-18 RX ORDER — IPRATROPIUM BROMIDE AND ALBUTEROL SULFATE 2.5; .5 MG/3ML; MG/3ML
1 SOLUTION RESPIRATORY (INHALATION)
Status: DISCONTINUED | OUTPATIENT
Start: 2023-07-18 | End: 2023-07-18 | Stop reason: HOSPADM

## 2023-07-18 RX ORDER — TROSPIUM CHLORIDE 20 MG/1
20 TABLET, FILM COATED ORAL 2 TIMES DAILY PRN
Status: DISCONTINUED | OUTPATIENT
Start: 2023-07-18 | End: 2023-07-19 | Stop reason: HOSPADM

## 2023-07-18 RX ORDER — SODIUM CHLORIDE 0.9 % (FLUSH) 0.9 %
5-40 SYRINGE (ML) INJECTION EVERY 12 HOURS SCHEDULED
Status: DISCONTINUED | OUTPATIENT
Start: 2023-07-18 | End: 2023-07-18 | Stop reason: HOSPADM

## 2023-07-18 RX ORDER — FENTANYL CITRATE 50 UG/ML
100 INJECTION, SOLUTION INTRAMUSCULAR; INTRAVENOUS
Status: DISCONTINUED | OUTPATIENT
Start: 2023-07-18 | End: 2023-07-18 | Stop reason: HOSPADM

## 2023-07-18 RX ORDER — NEOSTIGMINE METHYLSULFATE 1 MG/ML
INJECTION, SOLUTION INTRAVENOUS PRN
Status: DISCONTINUED | OUTPATIENT
Start: 2023-07-18 | End: 2023-07-18 | Stop reason: SDUPTHER

## 2023-07-18 RX ORDER — LIDOCAINE HYDROCHLORIDE 10 MG/ML
1 INJECTION, SOLUTION INFILTRATION; PERINEURAL
Status: DISCONTINUED | OUTPATIENT
Start: 2023-07-18 | End: 2023-07-18 | Stop reason: HOSPADM

## 2023-07-18 RX ORDER — HYDROMORPHONE HYDROCHLORIDE 2 MG/ML
0.5 INJECTION, SOLUTION INTRAMUSCULAR; INTRAVENOUS; SUBCUTANEOUS EVERY 5 MIN PRN
Status: DISCONTINUED | OUTPATIENT
Start: 2023-07-18 | End: 2023-07-18 | Stop reason: HOSPADM

## 2023-07-18 RX ORDER — ACETAMINOPHEN 325 MG/1
650 TABLET ORAL EVERY 4 HOURS PRN
Status: DISCONTINUED | OUTPATIENT
Start: 2023-07-18 | End: 2023-07-19 | Stop reason: HOSPADM

## 2023-07-18 RX ORDER — FENTANYL CITRATE 50 UG/ML
INJECTION, SOLUTION INTRAMUSCULAR; INTRAVENOUS PRN
Status: DISCONTINUED | OUTPATIENT
Start: 2023-07-18 | End: 2023-07-18 | Stop reason: SDUPTHER

## 2023-07-18 RX ORDER — PROCHLORPERAZINE EDISYLATE 5 MG/ML
5 INJECTION INTRAMUSCULAR; INTRAVENOUS
Status: COMPLETED | OUTPATIENT
Start: 2023-07-18 | End: 2023-07-18

## 2023-07-18 RX ORDER — ONDANSETRON 2 MG/ML
4 INJECTION INTRAMUSCULAR; INTRAVENOUS EVERY 6 HOURS PRN
Status: DISCONTINUED | OUTPATIENT
Start: 2023-07-18 | End: 2023-07-19 | Stop reason: HOSPADM

## 2023-07-18 RX ADMIN — ONDANSETRON 4 MG: 2 INJECTION INTRAMUSCULAR; INTRAVENOUS at 11:08

## 2023-07-18 RX ADMIN — Medication 10 MG: at 10:40

## 2023-07-18 RX ADMIN — HYDROMORPHONE HYDROCHLORIDE 0.5 MG: 2 INJECTION, SOLUTION INTRAMUSCULAR; INTRAVENOUS; SUBCUTANEOUS at 12:38

## 2023-07-18 RX ADMIN — FENTANYL CITRATE 50 MCG: 50 INJECTION, SOLUTION INTRAMUSCULAR; INTRAVENOUS at 08:30

## 2023-07-18 RX ADMIN — OXYCODONE HYDROCHLORIDE 5 MG: 5 TABLET ORAL at 17:14

## 2023-07-18 RX ADMIN — ACETAMINOPHEN 1000 MG: 500 TABLET, FILM COATED ORAL at 07:42

## 2023-07-18 RX ADMIN — PROPOFOL 30 MG: 10 INJECTION, EMULSION INTRAVENOUS at 11:19

## 2023-07-18 RX ADMIN — Medication 5 MG: at 09:20

## 2023-07-18 RX ADMIN — ROCURONIUM BROMIDE 10 MG: 50 INJECTION, SOLUTION INTRAVENOUS at 09:45

## 2023-07-18 RX ADMIN — PROPOFOL 150 MG: 10 INJECTION, EMULSION INTRAVENOUS at 08:34

## 2023-07-18 RX ADMIN — SODIUM CHLORIDE, PRESERVATIVE FREE 10 ML: 5 INJECTION INTRAVENOUS at 21:26

## 2023-07-18 RX ADMIN — Medication 2000 MG: at 08:40

## 2023-07-18 RX ADMIN — OXYCODONE HYDROCHLORIDE 5 MG: 5 TABLET ORAL at 12:38

## 2023-07-18 RX ADMIN — SODIUM CHLORIDE, POTASSIUM CHLORIDE, SODIUM LACTATE AND CALCIUM CHLORIDE: 600; 310; 30; 20 INJECTION, SOLUTION INTRAVENOUS at 17:14

## 2023-07-18 RX ADMIN — FAMOTIDINE 20 MG: 20 TABLET, FILM COATED ORAL at 17:22

## 2023-07-18 RX ADMIN — SENNOSIDES AND DOCUSATE SODIUM 1 TABLET: 8.6; 5 TABLET ORAL at 21:25

## 2023-07-18 RX ADMIN — ROCURONIUM BROMIDE 50 MG: 50 INJECTION, SOLUTION INTRAVENOUS at 08:34

## 2023-07-18 RX ADMIN — FENTANYL CITRATE 25 MCG: 50 INJECTION, SOLUTION INTRAMUSCULAR; INTRAVENOUS at 11:31

## 2023-07-18 RX ADMIN — GLYCOPYRROLATE 0.2 MG: 0.2 INJECTION INTRAMUSCULAR; INTRAVENOUS at 09:19

## 2023-07-18 RX ADMIN — PROPOFOL 50 MG: 10 INJECTION, EMULSION INTRAVENOUS at 11:50

## 2023-07-18 RX ADMIN — DEXAMETHASONE SODIUM PHOSPHATE 4 MG: 4 INJECTION, SOLUTION INTRAMUSCULAR; INTRAVENOUS at 08:40

## 2023-07-18 RX ADMIN — GLYCOPYRROLATE 0.6 MG: 0.2 INJECTION INTRAMUSCULAR; INTRAVENOUS at 11:30

## 2023-07-18 RX ADMIN — ROCURONIUM BROMIDE 5 MG: 50 INJECTION, SOLUTION INTRAVENOUS at 11:18

## 2023-07-18 RX ADMIN — PROCHLORPERAZINE EDISYLATE 5 MG: 5 INJECTION INTRAMUSCULAR; INTRAVENOUS at 12:41

## 2023-07-18 RX ADMIN — OXYCODONE HYDROCHLORIDE 5 MG: 5 TABLET ORAL at 21:25

## 2023-07-18 RX ADMIN — ROCURONIUM BROMIDE 10 MG: 50 INJECTION, SOLUTION INTRAVENOUS at 10:26

## 2023-07-18 RX ADMIN — SODIUM CHLORIDE, SODIUM LACTATE, POTASSIUM CHLORIDE, AND CALCIUM CHLORIDE: 600; 310; 30; 20 INJECTION, SOLUTION INTRAVENOUS at 10:15

## 2023-07-18 RX ADMIN — FENTANYL CITRATE 50 MCG: 50 INJECTION, SOLUTION INTRAMUSCULAR; INTRAVENOUS at 08:34

## 2023-07-18 RX ADMIN — Medication 3 MG: at 11:30

## 2023-07-18 RX ADMIN — GLYCOPYRROLATE 0.2 MG: 0.2 INJECTION INTRAMUSCULAR; INTRAVENOUS at 09:20

## 2023-07-18 RX ADMIN — FENTANYL CITRATE 25 MCG: 50 INJECTION, SOLUTION INTRAMUSCULAR; INTRAVENOUS at 11:50

## 2023-07-18 RX ADMIN — HYDROMORPHONE HYDROCHLORIDE 0.5 MG: 2 INJECTION, SOLUTION INTRAMUSCULAR; INTRAVENOUS; SUBCUTANEOUS at 14:55

## 2023-07-18 RX ADMIN — Medication 5 MG: at 10:05

## 2023-07-18 RX ADMIN — PROPOFOL 30 MG: 10 INJECTION, EMULSION INTRAVENOUS at 11:18

## 2023-07-18 RX ADMIN — ASPIRIN 81 MG: 81 TABLET ORAL at 21:25

## 2023-07-18 RX ADMIN — LIDOCAINE HYDROCHLORIDE 80 MG: 20 INJECTION, SOLUTION EPIDURAL; INFILTRATION; INTRACAUDAL; PERINEURAL at 08:34

## 2023-07-18 RX ADMIN — SODIUM CHLORIDE, SODIUM LACTATE, POTASSIUM CHLORIDE, AND CALCIUM CHLORIDE: 600; 310; 30; 20 INJECTION, SOLUTION INTRAVENOUS at 07:43

## 2023-07-18 RX ADMIN — PROPOFOL 50 MG: 10 INJECTION, EMULSION INTRAVENOUS at 11:30

## 2023-07-18 ASSESSMENT — PAIN SCALES - GENERAL
PAINLEVEL_OUTOF10: 0
PAINLEVEL_OUTOF10: 0
PAINLEVEL_OUTOF10: 8
PAINLEVEL_OUTOF10: 5
PAINLEVEL_OUTOF10: 6
PAINLEVEL_OUTOF10: 5

## 2023-07-18 ASSESSMENT — PAIN - FUNCTIONAL ASSESSMENT
PAIN_FUNCTIONAL_ASSESSMENT: WONG-BAKER FACES
PAIN_FUNCTIONAL_ASSESSMENT: 0-10

## 2023-07-18 ASSESSMENT — PAIN DESCRIPTION - DESCRIPTORS
DESCRIPTORS: SPASM;TEARING
DESCRIPTORS: ACHING

## 2023-07-18 ASSESSMENT — PAIN DESCRIPTION - PAIN TYPE: TYPE: ACUTE PAIN;SURGICAL PAIN

## 2023-07-18 ASSESSMENT — PAIN DESCRIPTION - LOCATION
LOCATION: INCISION
LOCATION: INCISION
LOCATION: ABDOMEN
LOCATION: ABDOMEN

## 2023-07-18 ASSESSMENT — PAIN DESCRIPTION - ORIENTATION: ORIENTATION: RIGHT;MID

## 2023-07-18 NOTE — PROGRESS NOTES
TRANSFER - IN REPORT:    Verbal report received from Shayy Esparza RN on Leighton Cease  being received from PACU for routine post-op      Report consisted of patient's Situation, Background, Assessment and   Recommendations(SBAR). Information from the following report(s) Nurse Handoff Report was reviewed with the receiving nurse. Opportunity for questions and clarification was provided. Assessment will be completed upon patient's arrival to unit and care assumed.

## 2023-07-18 NOTE — ANESTHESIA PROCEDURE NOTES
Airway  Date/Time: 7/18/2023 8:37 AM  Urgency: elective    Airway not difficult    General Information and Staff    Patient location during procedure: OR  Resident/CRNA: HOLLY Mirza - CRNA  Performed: resident/CRNA     Indications and Patient Condition  Indications for airway management: anesthesia and airway protection  Spontaneous Ventilation: absent  Sedation level: deep  Preoxygenated: yes  Patient position: sniffing  MILS maintained throughout  Mask difficulty assessment: vent by bag mask + OA or adjuvant +/- NMBA    Final Airway Details  Final airway type: endotracheal airway      Successful airway: ETT  Cuffed: yes   Successful intubation technique: direct laryngoscopy  Facilitating devices/methods: intubating stylet  Endotracheal tube insertion site: oral  Blade: Jinny  Blade size: #4  ETT size (mm): 7.5  Cormack-Lehane Classification: grade IIb - view of arytenoids or posterior of glottis only  Placement verified by: chest auscultation and capnometry   Inital cuff pressure (cm H2O): 8  Measured from: teeth  ETT to teeth (cm): 22  Number of attempts at approach: 1  Ventilation between attempts: bag mask  Number of other approaches attempted: 0    Additional Comments  PreO2 > 3 minutes. Standard IV induction by MDA. DL x 1 attempt. Atraumatic insertion. Positive equal and bilateral breath sounds noted with positive ETCO2 present. Lips and dentition unchanged from pre-op.    no

## 2023-07-18 NOTE — CARE COORDINATION
Pt chart reviewed for discharge planning. CM met pt and spouse at bedside, verified demographic information / health insurance. Pt lives with spouse in one level home, is independent with ADLs, ambulates with no DME, and drives. PCP was confirmed, last seen in office March 2023. Pt reports no outside services in the home at this time. CM will follow pt plan of care and assist with supportive care referrals pending pt clinical progress. Please consult case management if specific needs arise. 07/18/23 1612   Service Assessment   Patient Orientation Alert and Oriented   Cognition Alert   History Provided By Patient   Primary Caregiver Self   Accompanied By/Relationship Spouse, [address] [facility] [place] Spouse/Significant Other   Patient's Healthcare Decision Maker is: Named in [address]   PCP Verified by CM Yes  Mikala Muse MD)   Last Visit to PCP Within last 3 months   Prior Functional Level Independent in ADLs/IADLs   Current Functional Level Independent in ADLs/IADLs   Can patient return to prior living arrangement Yes   Ability to make needs known: Good   Family able to assist with home care needs: Yes   Would you like for me to discuss the discharge plan with any other family members/significant others, and if so, who? Yes  (Spouse)   Financial Resources Medicare  (AETNA)   Community Resources None   Social/Functional History   Lives With Spouse   Type of [address] [place] [address] Dr Sushma level   [address] [facility] Yes   Occupation Retired   Discharge Planning   Type of [address] [place] [address] Prior To Admission None   Potential Assistance Needed N/A   DME Ordered?  No   Potential Assistance Purchasing Medications No   Type of Home Care Services None   Patient expects to be discharged to: House   One/Two Story Residence One story   Condition of Participation: Discharge Planning   The Plan

## 2023-07-18 NOTE — ANESTHESIA POSTPROCEDURE EVALUATION
Department of Anesthesiology  Postprocedure Note    Patient: Joan De Leon  MRN: [id number]  YOB: 1950  Date of evaluation: 7/18/2023      Procedure Summary     Date: 07/18/23 Room / Location: [facility] MAIN OR  / [facility] MAIN OR    Anesthesia Start: 0620 Anesthesia Stop: 1026    Procedure: ROBOTIC ASSISTED LAPAROSCOPIC PROSTATECTOMY WITH BILATERAL LYMPH NODE DISSECTION (Abdomen) Diagnosis:       Prostate cancer ([address])      (Prostate cancer ([address]) Patience Chavez)    Providers: Malaika Jerry MD Responsible Provider: Jarrell Shrestha MD    Anesthesia Type: General ASA Status: 3          Anesthesia Type: General    William Phase I: William Score: 6    William Phase II:        Anesthesia Post Evaluation    Patient location during evaluation: PACU  Patient participation: complete - patient participated  Level of consciousness: awake  Airway patency: patent  Nausea & Vomiting: no nausea  Complications: no  Cardiovascular status: hemodynamically stable  Respiratory status: acceptable and nonlabored ventilation  Hydration status: stable  Multimodal analgesia pain management approach

## 2023-07-18 NOTE — OP NOTE
PREOPERATIVE DIAGNOSES:  1. Prostate cancer. POSTOPERATIVE DIAGNOSES:  1. Prostate cancer. PROCEDURES:  1. Robotic-assisted laparoscopic radical prostatectomy, left 50% nerve sparing, right 100% nerve sparing. 2. Bilateral pelvic lymph node dissection. SURGEON: Quinten Almaguer M.D.    ASSISTANTS:  Ankit Diaz      ANESTHESIA: General endotracheal anesthesia. SPECIMENS:  1. Left pelvic lymph nodes. 2. Right pelvic lymph nodes. 3. Periprostatic fat. 4. Prostate and seminal vesicles. 5. Posterior bladder neck. IMPLANTS: None    DRAINS:  1. 20-Luxembourger Valdez catheter to down drain. 2. 19-Luxembourger pelvic Darrian drain to bulb suction. ANTIBIOTICS: Ancef    COMPLICATIONS: None. ESTIMATED BLOOD LOSS: 100cc     INDICATIONS: The patient is a 75year old year old male with [place] 4+3 prostate cancer. He was diagnosed on transrectal biopsy for an elevated PSA. He now presents for prostatectomy for treatment of his prostate cancer. Risks and benefits of procedure were discussed in detail with the patient. DESCRIPTION OF PROCEDURE: General endotracheal anesthesia was induced and the patient was placed in a steep Trendelenburg position with legs stirrups. The patient's shoulders, arms, and legs were all carefully padded. His abdomen and genitals were prepped and draped in usual sterile fashion. An 18-Luxembourger Valdez catheter was inserted into the bladder sterilely. A time out was performed and antibiotics were given. A Veress needle was inserted into the umbilicus easily and after passing a saline drop test, the abdomen was insufflated to 15mmHg. An 8-mm supraumbilical port was placed. The camera was inserted and noted no intra-abdominal injuries. An 8-mm robotic arm port was placed in the left lateral abdomen and a 12-mm assistant port was placed in the left medial abdomen. Two 8-mm robotic ports were placed in the right abdomen and the robot was docked.     Adhesions between the bowel and

## 2023-07-19 ENCOUNTER — HOSPITAL ENCOUNTER (EMERGENCY)
Age: 73
Discharge: HOME | End: 2023-07-19
Attending: EMERGENCY MEDICINE
Payer: MEDICARE

## 2023-07-19 VITALS
TEMPERATURE: 98.6 F | SYSTOLIC BLOOD PRESSURE: 121 MMHG | HEIGHT: 66 IN | WEIGHT: 146.2 LBS | HEART RATE: 57 BPM | DIASTOLIC BLOOD PRESSURE: 58 MMHG | RESPIRATION RATE: 17 BRPM | BODY MASS INDEX: 23.5 KG/M2 | OXYGEN SATURATION: 100 %

## 2023-07-19 VITALS
DIASTOLIC BLOOD PRESSURE: 72 MMHG | SYSTOLIC BLOOD PRESSURE: 134 MMHG | RESPIRATION RATE: 20 BRPM | TEMPERATURE: 98.7 F | OXYGEN SATURATION: 95 % | HEART RATE: 71 BPM

## 2023-07-19 DIAGNOSIS — R31.9 URINARY TRACT INFECTION WITH HEMATURIA, SITE UNSPECIFIED: Primary | ICD-10-CM

## 2023-07-19 DIAGNOSIS — N39.0 URINARY TRACT INFECTION WITH HEMATURIA, SITE UNSPECIFIED: Primary | ICD-10-CM

## 2023-07-19 LAB
ANION GAP SERPL CALC-SCNC: 6 MMOL/L (ref 2–11)
ANION GAP SERPL CALC-SCNC: 8 MMOL/L (ref 2–11)
APPEARANCE UR: CLEAR
BACTERIA URNS QL MICRO: NEGATIVE /HPF
BASOPHILS # BLD: 0 K/UL (ref 0–0.2)
BASOPHILS NFR BLD: 0 % (ref 0–2)
BILIRUB UR QL: ABNORMAL
BUN SERPL-MCNC: 8 MG/DL (ref 8–23)
BUN SERPL-MCNC: 8 MG/DL (ref 8–23)
CALCIUM SERPL-MCNC: 8.2 MG/DL (ref 8.3–10.4)
CALCIUM SERPL-MCNC: 8.3 MG/DL (ref 8.3–10.4)
CASTS URNS QL MICRO: ABNORMAL /LPF
CHLORIDE SERPL-SCNC: 104 MMOL/L (ref 101–110)
CHLORIDE SERPL-SCNC: 110 MMOL/L (ref 101–110)
CO2 SERPL-SCNC: 24 MMOL/L (ref 21–32)
CO2 SERPL-SCNC: 26 MMOL/L (ref 21–32)
COLOR UR: ABNORMAL
CREAT SERPL-MCNC: 1 MG/DL (ref 0.8–1.5)
CREAT SERPL-MCNC: 1 MG/DL (ref 0.8–1.5)
DIFFERENTIAL METHOD BLD: ABNORMAL
EOSINOPHIL # BLD: 0.1 K/UL (ref 0–0.8)
EOSINOPHIL NFR BLD: 1 % (ref 0.5–7.8)
EPI CELLS #/AREA URNS HPF: ABNORMAL /HPF
ERYTHROCYTE [DISTWIDTH] IN BLOOD BY AUTOMATED COUNT: 14.1 % (ref 11.9–14.6)
ERYTHROCYTE [DISTWIDTH] IN BLOOD BY AUTOMATED COUNT: 14.3 % (ref 11.9–14.6)
GLUCOSE SERPL-MCNC: 122 MG/DL (ref 65–100)
GLUCOSE SERPL-MCNC: 124 MG/DL (ref 65–100)
GLUCOSE UR STRIP.AUTO-MCNC: NEGATIVE MG/DL
HCT VFR BLD AUTO: 34.9 % (ref 41.1–50.3)
HCT VFR BLD AUTO: 36 % (ref 41.1–50.3)
HGB BLD-MCNC: 11.6 G/DL (ref 13.6–17.2)
HGB BLD-MCNC: 11.8 G/DL (ref 13.6–17.2)
HGB UR QL STRIP: ABNORMAL
IMM GRANULOCYTES # BLD AUTO: 0 K/UL (ref 0–0.5)
IMM GRANULOCYTES NFR BLD AUTO: 0 % (ref 0–5)
KETONES UR QL STRIP.AUTO: NEGATIVE MG/DL
LACTATE SERPL-SCNC: 1.2 MMOL/L (ref 0.4–2)
LEUKOCYTE ESTERASE UR QL STRIP.AUTO: ABNORMAL
LYMPHOCYTES # BLD: 1.9 K/UL (ref 0.5–4.6)
LYMPHOCYTES NFR BLD: 19 % (ref 13–44)
MCH RBC QN AUTO: 30.5 PG (ref 26.1–32.9)
MCH RBC QN AUTO: 30.9 PG (ref 26.1–32.9)
MCHC RBC AUTO-ENTMCNC: 32.8 G/DL (ref 31.4–35)
MCHC RBC AUTO-ENTMCNC: 33.2 G/DL (ref 31.4–35)
MCV RBC AUTO: 93 FL (ref 82–102)
MCV RBC AUTO: 93.1 FL (ref 82–102)
MONOCYTES # BLD: 0.8 K/UL (ref 0.1–1.3)
MONOCYTES NFR BLD: 8 % (ref 4–12)
NEUTS SEG # BLD: 6.9 K/UL (ref 1.7–8.2)
NEUTS SEG NFR BLD: 72 % (ref 43–78)
NITRITE UR QL STRIP.AUTO: NEGATIVE
NRBC # BLD: 0 K/UL (ref 0–0.2)
NRBC # BLD: 0 K/UL (ref 0–0.2)
PH UR STRIP: 7.5 (ref 5–9)
PLATELET # BLD AUTO: 164 K/UL (ref 150–450)
PLATELET # BLD AUTO: 183 K/UL (ref 150–450)
PMV BLD AUTO: 8.8 FL (ref 9.4–12.3)
PMV BLD AUTO: 9.4 FL (ref 9.4–12.3)
POTASSIUM SERPL-SCNC: 3.9 MMOL/L (ref 3.5–5.1)
POTASSIUM SERPL-SCNC: 4 MMOL/L (ref 3.5–5.1)
PROCALCITONIN SERPL-MCNC: <0.05 NG/ML (ref 0–0.49)
PROT UR STRIP-MCNC: 100 MG/DL
RBC # BLD AUTO: 3.75 M/UL (ref 4.23–5.6)
RBC # BLD AUTO: 3.87 M/UL (ref 4.23–5.6)
RBC #/AREA URNS HPF: >100 /HPF
SODIUM SERPL-SCNC: 136 MMOL/L (ref 133–143)
SODIUM SERPL-SCNC: 142 MMOL/L (ref 133–143)
SP GR UR REFRACTOMETRY: 1.01 (ref 1–1.02)
UROBILINOGEN UR QL STRIP.AUTO: 0.2 EU/DL (ref 0.2–1)
WBC # BLD AUTO: 10.2 K/UL (ref 4.3–11.1)
WBC # BLD AUTO: 9.7 K/UL (ref 4.3–11.1)
WBC URNS QL MICRO: ABNORMAL /HPF

## 2023-07-19 PROCEDURE — 96365 THER/PROPH/DIAG IV INF INIT: CPT

## 2023-07-19 PROCEDURE — 6370000000 HC RX 637 (ALT 250 FOR IP): Performed by: UROLOGY

## 2023-07-19 PROCEDURE — 6370000000 HC RX 637 (ALT 250 FOR IP): Performed by: EMERGENCY MEDICINE

## 2023-07-19 PROCEDURE — 99284 EMERGENCY DEPT VISIT MOD MDM: CPT

## 2023-07-19 PROCEDURE — 96366 THER/PROPH/DIAG IV INF ADDON: CPT

## 2023-07-19 PROCEDURE — 85027 COMPLETE CBC AUTOMATED: CPT

## 2023-07-19 PROCEDURE — 80048 BASIC METABOLIC PNL TOTAL CA: CPT

## 2023-07-19 PROCEDURE — G0378 HOSPITAL OBSERVATION PER HR: HCPCS

## 2023-07-19 PROCEDURE — 96361 HYDRATE IV INFUSION ADD-ON: CPT

## 2023-07-19 PROCEDURE — 81001 URINALYSIS AUTO W/SCOPE: CPT

## 2023-07-19 PROCEDURE — 84145 PROCALCITONIN (PCT): CPT

## 2023-07-19 PROCEDURE — 83605 ASSAY OF LACTIC ACID: CPT

## 2023-07-19 PROCEDURE — 85025 COMPLETE CBC W/AUTO DIFF WBC: CPT

## 2023-07-19 PROCEDURE — 2580000003 HC RX 258: Performed by: EMERGENCY MEDICINE

## 2023-07-19 PROCEDURE — 36415 COLL VENOUS BLD VENIPUNCTURE: CPT

## 2023-07-19 PROCEDURE — 2580000003 HC RX 258: Performed by: UROLOGY

## 2023-07-19 PROCEDURE — 6360000002 HC RX W HCPCS: Performed by: EMERGENCY MEDICINE

## 2023-07-19 PROCEDURE — 87086 URINE CULTURE/COLONY COUNT: CPT

## 2023-07-19 RX ORDER — OXYCODONE HYDROCHLORIDE 5 MG/1
5 TABLET ORAL EVERY 4 HOURS PRN
Qty: 12 TABLET | Refills: 0 | Status: SHIPPED | OUTPATIENT
Start: 2023-07-19 | End: 2023-07-22

## 2023-07-19 RX ORDER — ACETAMINOPHEN 500 MG
1000 TABLET ORAL
Status: COMPLETED | OUTPATIENT
Start: 2023-07-19 | End: 2023-07-19

## 2023-07-19 RX ORDER — CIPROFLOXACIN 2 MG/ML
400 INJECTION, SOLUTION INTRAVENOUS ONCE
Status: COMPLETED | OUTPATIENT
Start: 2023-07-19 | End: 2023-07-19

## 2023-07-19 RX ORDER — 0.9 % SODIUM CHLORIDE 0.9 %
1000 INTRAVENOUS SOLUTION INTRAVENOUS ONCE
Status: COMPLETED | OUTPATIENT
Start: 2023-07-19 | End: 2023-07-19

## 2023-07-19 RX ORDER — DOCUSATE SODIUM 100 MG/1
100 CAPSULE, LIQUID FILLED ORAL 2 TIMES DAILY
Qty: 60 CAPSULE | Refills: 0 | Status: SHIPPED | OUTPATIENT
Start: 2023-07-19 | End: 2023-08-18

## 2023-07-19 RX ORDER — CIPROFLOXACIN 500 MG/1
500 TABLET, FILM COATED ORAL 2 TIMES DAILY
Qty: 14 TABLET | Refills: 0 | Status: SHIPPED | OUTPATIENT
Start: 2023-07-19 | End: 2023-07-26

## 2023-07-19 RX ADMIN — SODIUM CHLORIDE, POTASSIUM CHLORIDE, SODIUM LACTATE AND CALCIUM CHLORIDE: 600; 310; 30; 20 INJECTION, SOLUTION INTRAVENOUS at 08:52

## 2023-07-19 RX ADMIN — OXYCODONE HYDROCHLORIDE 5 MG: 5 TABLET ORAL at 14:31

## 2023-07-19 RX ADMIN — ACETAMINOPHEN 1000 MG: 500 TABLET, FILM COATED ORAL at 20:29

## 2023-07-19 RX ADMIN — SENNOSIDES AND DOCUSATE SODIUM 1 TABLET: 8.6; 5 TABLET ORAL at 08:47

## 2023-07-19 RX ADMIN — CIPROFLOXACIN 400 MG: 2 INJECTION, SOLUTION INTRAVENOUS at 20:31

## 2023-07-19 RX ADMIN — OXYCODONE HYDROCHLORIDE 5 MG: 5 TABLET ORAL at 09:07

## 2023-07-19 RX ADMIN — SODIUM CHLORIDE, PRESERVATIVE FREE 10 ML: 5 INJECTION INTRAVENOUS at 08:48

## 2023-07-19 RX ADMIN — OXYCODONE HYDROCHLORIDE 5 MG: 5 TABLET ORAL at 03:53

## 2023-07-19 RX ADMIN — SODIUM CHLORIDE 1000 ML: 9 INJECTION, SOLUTION INTRAVENOUS at 20:30

## 2023-07-19 RX ADMIN — FAMOTIDINE 20 MG: 20 TABLET, FILM COATED ORAL at 12:46

## 2023-07-19 ASSESSMENT — PAIN SCALES - GENERAL
PAINLEVEL_OUTOF10: 0
PAINLEVEL_OUTOF10: 5
PAINLEVEL_OUTOF10: 4
PAINLEVEL_OUTOF10: 4
PAINLEVEL_OUTOF10: 6
PAINLEVEL_OUTOF10: 1
PAINLEVEL_OUTOF10: 5

## 2023-07-19 ASSESSMENT — LIFESTYLE VARIABLES
HOW OFTEN DO YOU HAVE A DRINK CONTAINING ALCOHOL: NEVER
HOW MANY STANDARD DRINKS CONTAINING ALCOHOL DO YOU HAVE ON A TYPICAL DAY: PATIENT DOES NOT DRINK

## 2023-07-19 ASSESSMENT — PAIN DESCRIPTION - DESCRIPTORS
DESCRIPTORS: ACHING;DISCOMFORT

## 2023-07-19 ASSESSMENT — PAIN DESCRIPTION - ORIENTATION
ORIENTATION: MID
ORIENTATION: RIGHT;LEFT
ORIENTATION: LEFT;LOWER;RIGHT

## 2023-07-19 ASSESSMENT — PAIN DESCRIPTION - LOCATION
LOCATION: GROIN
LOCATION: ABDOMEN
LOCATION: GROIN

## 2023-07-19 ASSESSMENT — PAIN - FUNCTIONAL ASSESSMENT
PAIN_FUNCTIONAL_ASSESSMENT: ACTIVITIES ARE NOT PREVENTED
PAIN_FUNCTIONAL_ASSESSMENT: 0-10
PAIN_FUNCTIONAL_ASSESSMENT: ACTIVITIES ARE NOT PREVENTED
PAIN_FUNCTIONAL_ASSESSMENT: 0-10

## 2023-07-19 NOTE — ED TRIAGE NOTES
Was recently discharged after prostatectomy yesterday and started feeling chills and states he had a temp of 101 today.  Is wearing leg bag catheter

## 2023-07-19 NOTE — DISCHARGE INSTRUCTIONS
DISCHARGE SUMMARY from Nurse    PATIENT INSTRUCTIONS:    After general anesthesia or intravenous sedation, for 24 hours or while taking prescription Narcotics:  Limit your activities  Do not drive and operate hazardous machinery  Do not make important personal or business decisions  Do  not drink alcoholic beverages  If you have not urinated within 8 hours after discharge, please contact your surgeon on call. Report the following to your surgeon:  Excessive pain, swelling, redness or odor of or around the surgical area  Temperature over 100.5  Nausea and vomiting lasting longer than 4 hours or if unable to take medications  Any signs of decreased circulation or nerve impairment to extremity: change in color, persistent  numbness, tingling, coldness or increase pain  Any questions    What to do at Home:  Recommended activity: activity as tolerated. *  Please give a list of your current medications to your Primary Care Provider. *  Please update this list whenever your medications are discontinued, doses are      changed, or new medications (including over-the-counter products) are added. *  Please carry medication information at all times in case of emergency situations. These are general instructions for a healthy lifestyle:    No smoking/ No tobacco products/ Avoid exposure to second hand smoke  Surgeon General's Warning:  Quitting smoking now greatly reduces serious risk to your health. Obesity, smoking, and sedentary lifestyle greatly increases your risk for illness    A healthy diet, regular physical exercise & weight monitoring are important for maintaining a healthy lifestyle    You may be retaining fluid if you have a history of heart failure or if you experience any of the following symptoms:  Weight gain of 3 pounds or more overnight or 5 pounds in a week, increased swelling in our hands or feet or shortness of breath while lying flat in bed.   Please call your doctor as soon as you

## 2023-07-19 NOTE — PROGRESS NOTES
Discharge instructions given. Education provided. All questions answered and verbally voiced understanding. Medication changes and follow up appointments discussed. AVS reviewed, signed, and placed in chart. Copy provided for pt. Patient discharging with spouse.

## 2023-07-19 NOTE — PLAN OF CARE
Problem: Pain  Goal: Verbalizes/displays adequate comfort level or baseline comfort level  Outcome: Progressing     Problem: Safety - Adult  Goal: Free from fall injury  Outcome: Progressing     Problem: Discharge Planning  Goal: Discharge to home or other facility with appropriate resources  Outcome: Progressing  Flowsheets (Taken 7/18/2023 2450)  Discharge to home or other facility with appropriate resources:   Identify barriers to discharge with patient and caregiver   Arrange for needed discharge resources and transportation as appropriate   Identify discharge learning needs (meds, wound care, etc)     Problem: Skin/Tissue Integrity - Adult  Goal: Skin integrity remains intact  Outcome: Progressing  Goal: Incisions, wounds, or drain sites healing without S/S of infection  Outcome: Progressing  Goal: Oral mucous membranes remain intact  Outcome: Progressing     Problem: Musculoskeletal - Adult  Goal: Return mobility to safest level of function  Outcome: Progressing  Goal: Maintain proper alignment of affected body part  Outcome: Progressing  Goal: Return ADL status to a safe level of function  Outcome: Progressing     Problem: Gastrointestinal - Adult  Goal: Minimal or absence of nausea and vomiting  Outcome: Progressing  Goal: Maintains or returns to baseline bowel function  Outcome: Progressing  Goal: Maintains adequate nutritional intake  Outcome: Progressing     Problem: Genitourinary - Adult  Goal: Absence of urinary retention  Outcome: Progressing  Goal: Urinary catheter remains patent  Outcome: Progressing     Problem: Infection - Adult  Goal: Absence of infection at discharge  Outcome: Progressing  Goal: Absence of infection during hospitalization  Outcome: Progressing  Goal: Absence of fever/infection during anticipated neutropenic period  Outcome: Progressing     Problem: Metabolic/Fluid and Electrolytes - Adult  Goal: Electrolytes maintained within normal limits  Outcome: Progressing  Goal:

## 2023-07-20 ENCOUNTER — CARE COORDINATION (OUTPATIENT)
Dept: CARE COORDINATION | Facility: CLINIC | Age: 73
End: 2023-07-20

## 2023-07-20 ENCOUNTER — TELEPHONE (OUTPATIENT)
Dept: ONCOLOGY | Age: 73
End: 2023-07-20

## 2023-07-20 NOTE — TELEPHONE ENCOUNTER
Call to pt. Pt states he is doing well. Has some soreness which is to be expected. Reviewed upcoming appt date/time. Reviewed with pt what to expect at that appt and to bring disposable briefs. Pt VU and was appreciative of phone call.

## 2023-07-20 NOTE — CARE COORDINATION
[facility] Care Transitions Initial Follow Up Call    Call within 2 business days of discharge: Yes    Patient Current Location:  Home: [address] [place] [address] [phone]    LPN Care Coordinator contacted the patient by telephone to perform post hospital discharge assessment. Verified name and  with patient as identifiers. Provided introduction to self, and explanation of the LPN Care Coordinator role. Patient: Emelina Gaston Patient : 1950   MRN: [id number]  Reason for Admission: radical prostatectomy  Discharge Date: 23 RARS: No data recorded    Last Discharge [address] [facility] [facility]       Date Complaint Diagnosis Description Type Department Provider    23 Fever Urinary tract infection with hematuria, site unspecified ED (Alanna Sanchez) [facility] Luba Najjar, MD            Was this an external facility discharge? No Discharge Facility: [facility]    Challenges to be reviewed by the provider   Additional needs identified to be addressed with provider: No  none               Method of communication with provider: none. LPN Care Coordinator reviewed discharge instructions, medical action plan, and red flags with patient who verbalized understanding. The patient was given an opportunity to ask questions and does not have any further questions or concerns at this time. Were discharge instructions available to patient? Yes. Reviewed appropriate site of care based on symptoms and resources available to patient including: PCP  Specialist  Urgent care clinics  When to [address] The patient agrees to contact the PCP office for questions related to their healthcare. Advance Care Planning:   Does patient have an Advance Directive: reviewed and current. Medication reconciliation was performed with patient, who verbalizes understanding of administration of home medications.  Medications reviewed, 1111F entered: N/A    Was patient discharged with a pulse oximeter? no    Non-face-to-face services

## 2023-07-20 NOTE — DISCHARGE INSTRUCTIONS
Take your Cipro antibiotic starting tomorrow morning as directed  Follow-up with Dr. Criselda Camejo  Urine to the ER if worse in any way  Alternate Tylenol and ibuprofen as needed for fever

## 2023-07-21 ENCOUNTER — OFFICE VISIT (OUTPATIENT)
Dept: ONCOLOGY | Age: 73
End: 2023-07-21

## 2023-07-21 ENCOUNTER — TELEPHONE (OUTPATIENT)
Dept: ONCOLOGY | Age: 73
End: 2023-07-21

## 2023-07-21 VITALS
SYSTOLIC BLOOD PRESSURE: 150 MMHG | WEIGHT: 146 LBS | RESPIRATION RATE: 16 BRPM | HEART RATE: 77 BPM | BODY MASS INDEX: 23.93 KG/M2 | OXYGEN SATURATION: 99 % | TEMPERATURE: 98.3 F | DIASTOLIC BLOOD PRESSURE: 70 MMHG

## 2023-07-21 DIAGNOSIS — C61 PROSTATE CANCER (HCC): Primary | ICD-10-CM

## 2023-07-21 PROCEDURE — 99024 POSTOP FOLLOW-UP VISIT: CPT | Performed by: UROLOGY

## 2023-07-21 RX ORDER — OXYBUTYNIN CHLORIDE 5 MG/1
5 TABLET ORAL 3 TIMES DAILY
Qty: 90 TABLET | Refills: 0 | Status: SHIPPED | OUTPATIENT
Start: 2023-07-21

## 2023-07-21 ASSESSMENT — ENCOUNTER SYMPTOMS
RESPIRATORY NEGATIVE: 1
GASTROINTESTINAL NEGATIVE: 1

## 2023-07-21 NOTE — PROGRESS NOTES
in the mid gland, 5 o'clock, has a PI-RADS classification 4,  high probability for a clinically significant cancer). 3. There are changes of benign prostatic hypertrophy. ASSESSMENT:    Mr. Liset George is a 75 y.o. male with a diagnosis of prostate cancer. S/p TRUS fusion prostate biopsy on 6/6/23, GS 4+3=7; PSA 5.2; T1c; 31 cc; two pirads 4 lesion on mri. S/p RALP 3 days ago. I irrigated his Hollis catheter and got a few small clots out. It appears to be draining well. His incisions are all clear. I recommended he continue to hold his Plavix. I sent him home with an irrigation kit should it temporarily plugged again. I will see him next week on the following Monday for Hollis catheter removal      PLAN:   -irrigated hollis  -pt to rtc as schedule    ________________________________________      I have seen and examined this patient. I have reviewed and edited the note started by the MA and agree with the outlined plan. Part of this note was written by using a voice dictation software. The note has been proof read but may still contain some grammatical/other typographical errors.       Sylwia Davis, [address] [facility]

## 2023-07-21 NOTE — TELEPHONE ENCOUNTER
Physician provider: Gabby Lunsford  Reason for today's call: Catheter blockage  Last office visit: n/a    Pt's Spouse called stating Pt's catheter is blocked and they noticed an inch of blockage in tubing. Per Spouse, blockage seems to dislodge when Pt walks. Spouse asks what she needs to do.

## 2023-07-21 NOTE — TELEPHONE ENCOUNTER
Spoke with patient. He mentions that he is having the feeling of having to pee, it builds up and finally a little something comes out in the tube. He doesn't feel like the catheter is draining the way it should be. He had gotten up to walk this morning and when he did the catheter began to drain and that's when he noted clots. Morning clot about an inch in length, now he  has been passing small clots today. States only has about 25 cc in his bag since this morning. States he is drinking plenty of fluids. Will review with Dr. Jennifer Rich team  Please ask patient if he feels like his output has increased. If not we will need to see him in office today as soon as he can come. Call back to patient. States he had about 50 cc come out when spoke to me earlier but now he feels its building up again. He states he can be here in about 30 minutes.  Message to Dr. Jennifer Rich team to make aware

## 2023-07-22 LAB
BACTERIA SPEC CULT: NORMAL
SERVICE CMNT-IMP: NORMAL

## 2023-07-25 NOTE — PROGRESS NOTES
[address] [facility] Hematology & Oncology  [address] [facility], [address] [place] [address]  [phone]        Mr. Deann Martinez is a 75 y.o. male with a diagnosis of PCa s/p RALP with BPLND on 7/18/23, PROSTATIC ADENOCARCINOMA (RONALD 3 + 4 = 7 (WITH FOCAL TERTIARY 5)), pT2N0 (0/18 nodes). INTERVAL HISTORY: Patient is here today for follow-up after his robotic prostatectomy on 7/18/2023. He has no complaints. He is back on his Plavix and his urine output has been clear. His bowels are working normally. He does have some soreness in the perineum but his scrotal swelling has improved. He denies any lower extremity edema or calf tenderness or swelling. From previous note:  His wife for follow-up of his unfavorable intermediate risk adenocarcinoma prostate. Since I last saw him he underwent a bone scan on 6/22/2023. It showed some increased uptake at L5 which likely corresponds with degenerative changes. There is a focus of abnormal uptake in the right ninth posterior rib. Of note patient did fall and fractured a rib and damaged his lower back in the past.     He also saw a radiation oncology and heard about all of his radiation options. He states he has thought about things and would like to move forward with radical prostatectomy. We also ordered a decipher test on him which is still pending. Surgical history includes a right inguinal hernia repair with mesh. He is also had vasectomy. Of note he is on Plavix for history of a TIA. He also is a history of hypertension hyperlipidemia and GERD. He is otherwise a fit appearing male for his age. From previous note:  Patient is here today with his wife for follow-up after his MRI fusion guided biopsy of his prostate. He tolerated the procedure well. He states he is back on his Plavix and is not currently having any blood per rectum or in his urine.      His biopsy showed 2 cores of [place] 4+3 equal 7

## 2023-07-27 ENCOUNTER — CARE COORDINATION (OUTPATIENT)
Dept: CARE COORDINATION | Facility: CLINIC | Age: 73
End: 2023-07-27

## 2023-07-27 NOTE — CARE COORDINATION
[facility] Care Transitions Follow Up Call    Patient Current Location:  Home: [address] [place] [address] [phone]    [facility] Care [facility]inator contacted the patient by telephone to follow up after admission on 2023. Verified name and  with patient as identifiers. Patient: Liset George  Patient : 1950   MRN: [id number]  Reason for Admission: radical prostatectomy   Discharge Date: 23 RARS: No data recorded    Needs to be reviewed by the provider   Additional needs identified to be addressed with provider: No  none             Method of communication with provider: none. Addressed changes since last contact:  none  Discussed follow-up appointments. If no appointment was previously scheduled, appointment scheduling offered: Yes. Is follow up appointment scheduled within 7 days of discharge? Yes. Follow Up  Future Appointments   Date Time Provider [address]   2023  8:00 AM Adrienne Bender MD [facility] AMB   2023 10:15 AM Radha Hollis MD [facility] AMB   10/2/2023  9:00 AM Emi Prado MD [facility]     Non-[facility] follow up appointment(s): vincenzo    N Care Coordinator reviewed discharge instructions, medical action plan, and red flags with patient and discussed any barriers to care and/or understanding of plan of care after discharge. Discussed appropriate site of care based on symptoms and resources available to patient including: PCP  Specialist  Urgent care clinics  When to call Ralf Pringle. The patient agrees to contact the PCP office for questions related to their healthcare. Advance Care Planning:   reviewed and current.      Patients top risk factors for readmission: medical condition-TIA, HTN, Hemorrhoids, Palpitations, GERD, Gastritis, Prostate CA, Osteoporosis  Interventions to address risk factors: Obtained and reviewed discharge summary and/or continuity of care documents, Education of patient/family/caregiver/guardian to support

## 2023-07-31 ENCOUNTER — OFFICE VISIT (OUTPATIENT)
Dept: ONCOLOGY | Age: 73
End: 2023-07-31

## 2023-07-31 VITALS
OXYGEN SATURATION: 99 % | BODY MASS INDEX: 23.46 KG/M2 | HEART RATE: 64 BPM | SYSTOLIC BLOOD PRESSURE: 131 MMHG | DIASTOLIC BLOOD PRESSURE: 77 MMHG | WEIGHT: 146 LBS | RESPIRATION RATE: 18 BRPM | TEMPERATURE: 97.9 F | HEIGHT: 66 IN

## 2023-07-31 DIAGNOSIS — C61 PROSTATE CANCER (HCC): Primary | ICD-10-CM

## 2023-07-31 PROCEDURE — 99024 POSTOP FOLLOW-UP VISIT: CPT | Performed by: UROLOGY

## 2023-07-31 ASSESSMENT — PATIENT HEALTH QUESTIONNAIRE - PHQ9
SUM OF ALL RESPONSES TO PHQ QUESTIONS 1-9: 0
2. FEELING DOWN, DEPRESSED OR HOPELESS: 0
SUM OF ALL RESPONSES TO PHQ9 QUESTIONS 1 & 2: 0
1. LITTLE INTEREST OR PLEASURE IN DOING THINGS: 0
SUM OF ALL RESPONSES TO PHQ QUESTIONS 1-9: 0

## 2023-07-31 ASSESSMENT — ENCOUNTER SYMPTOMS
RESPIRATORY NEGATIVE: 1
GASTROINTESTINAL NEGATIVE: 1

## 2023-07-31 NOTE — PATIENT INSTRUCTIONS
Patient Instructions from Today's Visit    Reason for Visit:  Post op/pathology review     Diagnosis Information:  [email or web address] [email or web address]-us/[email or web address]-answers-[email or web address]-education-[email or web address]-[email or web address]-[email or web address]-sheets      Plan: We will remove the hollis today. You will notice leakage that should start to improve over the next month or so. Your final pathology does show wild 7 (3+4) prostate cancer, margins were negative. Because of the tertiary pattern being 5, this does put you at a greater risk of the cancer coming back. We will plan to monitor your labs closely. We will check your psa in 4 weeks. Follow Up:  4 weeks with labs prior     Recent Lab Results:  N/a    Treatment Summary has been discussed and given to patient:   N/a      -------------------------------------------------------------------------------------------------------------------    Patient does express an interest in My Chart. My Chart log in information explained on the after visit summary printout at the [address]  desk.     THOMAS Skaggs

## 2023-08-08 ASSESSMENT — ENCOUNTER SYMPTOMS
EYE DISCHARGE: 0
ABDOMINAL PAIN: 0
FACIAL SWELLING: 0
VOMITING: 0
BACK PAIN: 0
EYE REDNESS: 0
COLOR CHANGE: 0
COUGH: 0
SHORTNESS OF BREATH: 0
NAUSEA: 0
RHINORRHEA: 0
DIARRHEA: 0

## 2023-08-10 ENCOUNTER — CARE COORDINATION (OUTPATIENT)
Dept: CARE COORDINATION | Facility: CLINIC | Age: 73
End: 2023-08-10

## 2023-08-10 NOTE — CARE COORDINATION
patient/family/caregiver/guardian to support self-management-Gaby Rocha LPN -[phone], and all scheduled appointments. Offered patient enrollment in the Remote Patient Monitoring (RPM) program for in-home monitoring: NA.     Care Transitions Subsequent and Final Call    Subsequent and Final Calls  Do you have any ongoing symptoms?: No  Have your medications changed?: No  Do you have any questions related to your medications?: No  Do you currently have any active services?: No  Do you have any needs or concerns that I can assist you with?: No  Identified Barriers: None  Care Transitions Interventions  Other Interventions:             LPN Care Coordinator provided contact information for future needs. Plan for follow-up call in 7-10 days based on severity of symptoms and risk factors. Plan for next call: self management-diet, hydration, exercise, follow up appointments.      Jacob Clements LPN

## 2023-08-15 RX ORDER — DOCUSATE SODIUM 100 MG/1
CAPSULE, LIQUID FILLED ORAL
Qty: 60 CAPSULE | Refills: 0 | Status: SHIPPED | OUTPATIENT
Start: 2023-08-15

## 2023-08-17 ENCOUNTER — CARE COORDINATION (OUTPATIENT)
Dept: CARE COORDINATION | Facility: CLINIC | Age: 73
End: 2023-08-17

## 2023-08-17 NOTE — CARE COORDINATION
Patient has graduated from the Care Transitions program on 08/17/2023. Patient/family has the ability to self-manage at this time. Patient has no further care management needs, no referral to the [facility] team for further management. Patient has Care Transition Nurse's contact information for any further questions, concerns, or needs. Andrea Caruso N [address] [facility] [phone].   Patients upcoming visits:    Future Appointments   Date Time Provider [address] [place] [address]   8/28/2023  9:00 AM [facility]   9/1/2023  8:00 AM Maliha Zimmerman MD [facility] AMB   9/25/2023 10:15 AM Jerry Mg MD [facility] AMB   10/2/2023  9:00 AM Iven Cabot, MD [facility] AMB

## 2023-08-21 ENCOUNTER — TELEPHONE (OUTPATIENT)
Age: 73
End: 2023-08-21

## 2023-08-21 PROCEDURE — 93298 REM INTERROG DEV EVAL SCRMS: CPT | Performed by: INTERNAL MEDICINE

## 2023-08-21 PROCEDURE — G2066 INTER DEVC REMOTE 30D: HCPCS | Performed by: INTERNAL MEDICINE

## 2023-08-21 NOTE — TELEPHONE ENCOUNTER
Loop recorder showing 7 minute event of possible AF vs. AT. Pt with hx of CVA. Full report available in [facility].

## 2023-08-28 ENCOUNTER — HOSPITAL ENCOUNTER (OUTPATIENT)
Dept: LAB | Age: 73
Discharge: HOME | End: 2023-08-31
Payer: MEDICARE

## 2023-08-28 DIAGNOSIS — C61 PROSTATE CANCER (HCC): ICD-10-CM

## 2023-08-28 DIAGNOSIS — C61 PROSTATE CANCER (HCC): Primary | ICD-10-CM

## 2023-08-28 DIAGNOSIS — R79.9 ABNORMAL FINDING OF BLOOD CHEMISTRY, UNSPECIFIED: ICD-10-CM

## 2023-08-28 PROCEDURE — 36415 COLL VENOUS BLD VENIPUNCTURE: CPT

## 2023-08-28 PROCEDURE — 84153 ASSAY OF PSA TOTAL: CPT

## 2023-08-29 LAB — PSA SERPL DL<=0.01 NG/ML-MCNC: 0.01 NG/ML (ref 0–4)

## 2023-08-29 NOTE — PROGRESS NOTES
craniocaudal.      Peripheral zone:  Lesion 1: There is a 1.3 cm lesion with focal homogeneous hypointensity on the  ADC map (series 550 image 9), located on the posterior right peripheral zone in  the mid gland at 8 o'clock 1 cm from the midline. There is associated  hyperintensity on the high b value diffusion-weighted sequence. There is  associated focal hypointensity on the T2 sequences. Analysis of the dynamic pre-  and postgadolinium kinetics demonstrates findings positive for focal, early  enhancement. PI-RADS classification: 4 (high probability for the presence of  clinically significant cancer). Lesion 2: There is a 0.9 cm lesion with focal homogeneous hypointensity on the  ADC map (series 550 image 9), located on the posterior left peripheral zone in  the mid gland at 5 o'clock 1 cm from the midline. There is associated  hyperintensity on the high b value diffusion-weighted sequence. There is  associated focal hypointensity on the T2 sequences. Analysis of the dynamic pre-  and postgadolinium kinetics demonstrates findings positive for focal, early  enhancement. PI-RADS classification: 4 (high probability for the presence of  clinically significant cancer). Transition zone: Signal changes consistent with benign prostatic hypertrophy are  noted. No focal homogeneous hypointense lesion on T2 sequence to suggest  transitional zone malignancy. The prostatic capsule appears intact. The rectoprostatic angle and neurovascular  bundles are unremarkable. The seminal vesicles are symmetric and unremarkable. The urinary bladder is unremarkable. Remainder of the pelvis: No enlarged lymph nodes are identified in the pelvis. No aggressive appearing lesions in the visualized bony pelvis. - Impression -  1.  Lesion 1 (PI-RADS 4): A 1.3 cm lesion located at the posterior right  peripheral zone in the mid gland, 8 o'clock, has a PI-RADS classification 4,  high probability for a clinically significant

## 2023-09-01 ENCOUNTER — OFFICE VISIT (OUTPATIENT)
Dept: ONCOLOGY | Age: 73
End: 2023-09-01

## 2023-09-01 VITALS — BODY MASS INDEX: 24.19 KG/M2 | WEIGHT: 150.5 LBS | HEIGHT: 66 IN

## 2023-09-01 DIAGNOSIS — C61 PROSTATE CANCER (HCC): Primary | ICD-10-CM

## 2023-09-01 PROCEDURE — 99024 POSTOP FOLLOW-UP VISIT: CPT | Performed by: UROLOGY

## 2023-09-01 RX ORDER — SILDENAFIL CITRATE 20 MG/1
TABLET ORAL
Qty: 40 TABLET | Refills: 0 | Status: SHIPPED | OUTPATIENT
Start: 2023-09-01

## 2023-09-01 ASSESSMENT — ENCOUNTER SYMPTOMS
RESPIRATORY NEGATIVE: 1
GASTROINTESTINAL NEGATIVE: 1

## 2023-09-01 ASSESSMENT — PATIENT HEALTH QUESTIONNAIRE - PHQ9: DEPRESSION UNABLE TO ASSESS: PT REFUSES

## 2023-09-01 NOTE — PATIENT INSTRUCTIONS
Patient Instructions from Today's Visit    Reason for Visit:  Follow up    Diagnosis Information:  https://www.[employer]/. net/about-us/asco-answers-patient-education-materials/qvmq-nwtiqsn-khqz-sheets      Plan: Your psa is undetectable, this is good! We would like to recheck the labs in about 3 months and if you are still stable, we will move you out to 6 months. We will send a prescription of sildenafil to your pharmacy. Instructions on how to take will be included in the prescription. You can go online to [employer] and put in the drug name and it will show the drug and how much it cost at different pharmacies with the coupon. You then select which pharmacy you would like and you can either have the coupon sent through text, email or print. Follow Up: In 3 months with labs prior    Recent Lab Results:  Results for orders placed or performed during the hospital encounter of 08/28/23   PSA, ultrasensitive   Result Value Ref Range    PSA, Ultrasensitive 0.007 0.000 - 4.000 ng/mL         Treatment Summary has been discussed and given to patient:   N/a    -------------------------------------------------------------------------------------------------------------------    Patient does express an interest in My Chart. My Chart log in information explained on the after visit summary printout at the [address] desk.     THOMAS Guillermo

## 2023-09-07 ENCOUNTER — TELEPHONE (OUTPATIENT)
Dept: INTERNAL MEDICINE CLINIC | Facility: CLINIC | Age: 73
End: 2023-09-07

## 2023-09-11 RX ORDER — SILDENAFIL CITRATE 20 MG/1
TABLET ORAL
Qty: 40 TABLET | Refills: 0 | Status: SHIPPED | OUTPATIENT
Start: 2023-09-11

## 2023-09-18 ENCOUNTER — CLINICAL DOCUMENTATION (OUTPATIENT)
Dept: ONCOLOGY | Age: 73
End: 2023-09-18

## 2023-09-19 NOTE — PROGRESS NOTES
Prior authorization request for Sildenafil Citrate 20 mg tablets received through [employer] under Key Code [id number]. Patient demographics and clinical information submitted through the portal and sent to [facility] Medicare Part D for medical review. Message received that this plan prefers not to have attachments submitted; however, since the patient does not have a diagnosis of pulmonary hypertension (which is the only clinical question that was asked as part of the PA request), the most recent oncology office visit note dated 9/1/23 was uploaded to the portal for medical review. Pending review.

## 2023-09-21 ASSESSMENT — PATIENT HEALTH QUESTIONNAIRE - PHQ9
SUM OF ALL RESPONSES TO PHQ QUESTIONS 1-9: 0
1. LITTLE INTEREST OR PLEASURE IN DOING THINGS: 0
SUM OF ALL RESPONSES TO PHQ QUESTIONS 1-9: 0
2. FEELING DOWN, DEPRESSED OR HOPELESS: 0
SUM OF ALL RESPONSES TO PHQ QUESTIONS 1-9: 0
SUM OF ALL RESPONSES TO PHQ QUESTIONS 1-9: 0
SUM OF ALL RESPONSES TO PHQ9 QUESTIONS 1 & 2: 0

## 2023-09-21 ASSESSMENT — LIFESTYLE VARIABLES
HOW OFTEN DO YOU HAVE A DRINK CONTAINING ALCOHOL: 2-4 TIMES A MONTH
HOW MANY STANDARD DRINKS CONTAINING ALCOHOL DO YOU HAVE ON A TYPICAL DAY: 1 OR 2

## 2023-09-22 SDOH — HEALTH STABILITY: PHYSICAL HEALTH: ON AVERAGE, HOW MANY MINUTES DO YOU ENGAGE IN EXERCISE AT THIS LEVEL?: 30 MIN

## 2023-09-22 SDOH — HEALTH STABILITY: PHYSICAL HEALTH: ON AVERAGE, HOW MANY DAYS PER WEEK DO YOU ENGAGE IN MODERATE TO STRENUOUS EXERCISE (LIKE A BRISK WALK)?: 2 DAYS

## 2023-09-22 ASSESSMENT — PATIENT HEALTH QUESTIONNAIRE - PHQ9
SUM OF ALL RESPONSES TO PHQ QUESTIONS 1-9: 0
SUM OF ALL RESPONSES TO PHQ9 QUESTIONS 1 & 2: 0
SUM OF ALL RESPONSES TO PHQ QUESTIONS 1-9: 0
SUM OF ALL RESPONSES TO PHQ QUESTIONS 1-9: 0
2. FEELING DOWN, DEPRESSED OR HOPELESS: NOT AT ALL
1. LITTLE INTEREST OR PLEASURE IN DOING THINGS: NOT AT ALL
SUM OF ALL RESPONSES TO PHQ QUESTIONS 1-9: 0
SUM OF ALL RESPONSES TO PHQ9 QUESTIONS 1 & 2: 0
1. LITTLE INTEREST OR PLEASURE IN DOING THINGS: 0
2. FEELING DOWN, DEPRESSED OR HOPELESS: 0

## 2023-09-22 ASSESSMENT — LIFESTYLE VARIABLES
HOW OFTEN DO YOU HAVE SIX OR MORE DRINKS ON ONE OCCASION: 1
HOW MANY STANDARD DRINKS CONTAINING ALCOHOL DO YOU HAVE ON A TYPICAL DAY: 1
HOW OFTEN DO YOU HAVE A DRINK CONTAINING ALCOHOL: MONTHLY OR LESS
HOW OFTEN DO YOU HAVE A DRINK CONTAINING ALCOHOL: 2
HOW MANY STANDARD DRINKS CONTAINING ALCOHOL DO YOU HAVE ON A TYPICAL DAY: 1 OR 2

## 2023-09-25 ENCOUNTER — OFFICE VISIT (OUTPATIENT)
Dept: INTERNAL MEDICINE CLINIC | Facility: CLINIC | Age: 73
End: 2023-09-25

## 2023-09-25 VITALS
WEIGHT: 149 LBS | SYSTOLIC BLOOD PRESSURE: 139 MMHG | OXYGEN SATURATION: 99 % | BODY MASS INDEX: 23.95 KG/M2 | HEART RATE: 72 BPM | HEIGHT: 66 IN | TEMPERATURE: 98.1 F | DIASTOLIC BLOOD PRESSURE: 76 MMHG

## 2023-09-25 DIAGNOSIS — Z00.00 MEDICARE ANNUAL WELLNESS VISIT, SUBSEQUENT: Primary | ICD-10-CM

## 2023-09-25 DIAGNOSIS — Z86.73 HISTORY OF TIA (TRANSIENT ISCHEMIC ATTACK): ICD-10-CM

## 2023-09-25 DIAGNOSIS — I10 ESSENTIAL HYPERTENSION: ICD-10-CM

## 2023-09-25 DIAGNOSIS — R13.19 ESOPHAGEAL DYSPHAGIA: ICD-10-CM

## 2023-09-25 DIAGNOSIS — Z23 NEEDS FLU SHOT: ICD-10-CM

## 2023-09-25 DIAGNOSIS — M81.0 AGE-RELATED OSTEOPOROSIS WITHOUT CURRENT PATHOLOGICAL FRACTURE: ICD-10-CM

## 2023-09-25 DIAGNOSIS — K21.9 GASTROESOPHAGEAL REFLUX DISEASE, UNSPECIFIED WHETHER ESOPHAGITIS PRESENT: ICD-10-CM

## 2023-09-25 RX ORDER — CLOPIDOGREL BISULFATE 75 MG/1
75 TABLET ORAL DAILY
COMMUNITY

## 2023-09-25 RX ORDER — PANTOPRAZOLE SODIUM 40 MG/1
40 TABLET, DELAYED RELEASE ORAL
Qty: 90 TABLET | Refills: 1 | Status: SHIPPED | OUTPATIENT
Start: 2023-09-25

## 2023-09-28 RX ORDER — SILDENAFIL CITRATE 20 MG/1
TABLET ORAL
Qty: 40 TABLET | Refills: 3 | Status: SHIPPED | OUTPATIENT
Start: 2023-09-28

## 2023-10-01 ASSESSMENT — ENCOUNTER SYMPTOMS
ABDOMINAL PAIN: 0
TROUBLE SWALLOWING: 1
SHORTNESS OF BREATH: 0

## 2023-10-02 ENCOUNTER — OFFICE VISIT (OUTPATIENT)
Age: 73
End: 2023-10-02
Payer: MEDICARE

## 2023-10-02 VITALS
WEIGHT: 150 LBS | BODY MASS INDEX: 24.11 KG/M2 | DIASTOLIC BLOOD PRESSURE: 68 MMHG | SYSTOLIC BLOOD PRESSURE: 122 MMHG | HEIGHT: 66 IN | HEART RATE: 56 BPM

## 2023-10-02 DIAGNOSIS — G45.9 TIA (TRANSIENT ISCHEMIC ATTACK): Primary | ICD-10-CM

## 2023-10-02 DIAGNOSIS — I10 ESSENTIAL HYPERTENSION: ICD-10-CM

## 2023-10-02 DIAGNOSIS — R00.2 PALPITATIONS: ICD-10-CM

## 2023-10-02 PROCEDURE — 1123F ACP DISCUSS/DSCN MKR DOCD: CPT | Performed by: INTERNAL MEDICINE

## 2023-10-02 PROCEDURE — 99214 OFFICE O/P EST MOD 30 MIN: CPT | Performed by: INTERNAL MEDICINE

## 2023-10-02 PROCEDURE — 3078F DIAST BP <80 MM HG: CPT | Performed by: INTERNAL MEDICINE

## 2023-10-02 PROCEDURE — 3074F SYST BP LT 130 MM HG: CPT | Performed by: INTERNAL MEDICINE

## 2023-10-02 ASSESSMENT — ENCOUNTER SYMPTOMS
ORTHOPNEA: 0
SHORTNESS OF BREATH: 0
ABDOMINAL PAIN: 0

## 2023-10-02 NOTE — PROGRESS NOTES
[facility] CARDIOLOGY  [address] [address]  PHONE: [phone]      10/02/23    NAME:  Jhonny Phillips  : 1950  MRN: [id number]         SUBJECTIVE:   Jhonny Phillips is a 75 y.o. male seen for a follow up visit regarding the following:     Chief Complaint   Patient presents with    Hypertension    Irregular Heart Beat            HPI:  Follow up  Hypertension and Irregular Heart Beat   . Mr. Darron Low presents today for follow-up. Patient been doing well has no complaints today. Denies chest pain, shortness of breath, orthopnea, PND, palpitations, syncope or lower extremity swelling. Hypertension  Pertinent negatives include no chest pain, orthopnea, palpitations, PND or shortness of breath. Irregular Heart Beat   Associated symptoms include an irregular heartbeat. Pertinent negatives include no chest pain, diaphoresis, fever, near-syncope or shortness of breath. Key CAD CHF Meds            sildenafil (REVATIO) 20 MG tablet (Taking)    Sig: Take two tablets on an empty stomach. Wait 45 minutes for stimulation. You can increase this dose to a max of five tablets. No prior authorization was found for this prescription. Found prior authorization for another prescription for the same medication: Payer Waiting for Response    simvastatin (ZOCOR) 20 MG tablet (Taking)    Class: Historical Med    amLODIPine (NORVASC) 2.5 MG tablet (Taking)    Sig: TAKE ONE TABLET BY MOUTH EVERY DAY FOR BLOOD PRESSURE          Key Antihyperglycemic Medications       Patient is on no antihyperglycemic meds. Past Medical History, Past Surgical History, Family history, Social History, and Medications were all reviewed with the patient today and updated as necessary. Prior to Admission medications    Medication Sig Start Date End Date Taking? Authorizing Provider   sildenafil (REVATIO) 20 MG tablet Take two tablets on an empty stomach.  Wait 45

## 2023-10-05 PROCEDURE — G2066 INTER DEVC REMOTE 30D: HCPCS | Performed by: INTERNAL MEDICINE

## 2023-10-05 PROCEDURE — 93298 REM INTERROG DEV EVAL SCRMS: CPT | Performed by: INTERNAL MEDICINE

## 2023-10-09 ENCOUNTER — HOSPITAL ENCOUNTER (OUTPATIENT)
Dept: GENERAL RADIOLOGY | Age: 73
Discharge: HOME | End: 2023-10-12
Attending: INTERNAL MEDICINE
Payer: MEDICARE

## 2023-10-09 DIAGNOSIS — R13.19 ESOPHAGEAL DYSPHAGIA: ICD-10-CM

## 2023-10-09 DIAGNOSIS — K21.9 GASTROESOPHAGEAL REFLUX DISEASE, UNSPECIFIED WHETHER ESOPHAGITIS PRESENT: ICD-10-CM

## 2023-10-09 PROCEDURE — 6370000000 HC RX 637 (ALT 250 FOR IP): Performed by: INTERNAL MEDICINE

## 2023-10-09 PROCEDURE — 2500000003 HC RX 250 WO HCPCS: Performed by: INTERNAL MEDICINE

## 2023-10-09 PROCEDURE — 74246 X-RAY XM UPR GI TRC 2CNTRST: CPT

## 2023-10-09 RX ADMIN — BARIUM SULFATE 140 ML: 980 POWDER, FOR SUSPENSION ORAL at 12:28

## 2023-10-09 RX ADMIN — BARIUM SULFATE 355 ML: 0.6 SUSPENSION ORAL at 12:28

## 2023-10-09 RX ADMIN — ANTACID/ANTIFLATULENT 1 EACH: 380; 550; 10; 10 GRANULE, EFFERVESCENT ORAL at 12:28

## 2023-10-09 RX ADMIN — BARIUM SULFATE 1 TABLET: 700 TABLET ORAL at 12:28

## 2023-10-10 ENCOUNTER — TELEPHONE (OUTPATIENT)
Dept: INTERNAL MEDICINE CLINIC | Facility: CLINIC | Age: 73
End: 2023-10-10

## 2023-10-10 NOTE — RESULT ENCOUNTER NOTE
He has hiatal hernia with actual reflux noted on his UGI. I recommend he increases Protonix to 40mg twice a day. Take 30 mins prior to meals. Please reach out to me if symptoms are not better after 8 weeks.    Emma De La Fuente MD

## 2023-10-10 NOTE — TELEPHONE ENCOUNTER
----- Message from Xu Chatterjee MD sent at 10/10/2023  2:38 PM EDT -----  He has hiatal hernia with actual reflux noted on his UGI. I recommend he increases Protonix to 40mg twice a day. Take 30 mins prior to meals. Please reach out to me if symptoms are not better after 8 weeks.    Xu Chatterjee MD

## 2023-10-17 ENCOUNTER — PATIENT MESSAGE (OUTPATIENT)
Dept: INTERNAL MEDICINE CLINIC | Facility: CLINIC | Age: 73
End: 2023-10-17

## 2023-10-18 RX ORDER — PANTOPRAZOLE SODIUM 40 MG/1
40 TABLET, DELAYED RELEASE ORAL
Qty: 180 TABLET | Refills: 1 | Status: SHIPPED | OUTPATIENT
Start: 2023-10-18

## 2023-10-30 RX ORDER — OXYBUTYNIN CHLORIDE 5 MG/1
TABLET ORAL
Qty: 90 TABLET | Refills: 0 | OUTPATIENT
Start: 2023-10-30

## 2023-10-31 ENCOUNTER — NURSE ONLY (OUTPATIENT)
Dept: INTERNAL MEDICINE CLINIC | Facility: CLINIC | Age: 73
End: 2023-10-31

## 2023-10-31 DIAGNOSIS — M81.0 AGE-RELATED OSTEOPOROSIS WITHOUT CURRENT PATHOLOGICAL FRACTURE: ICD-10-CM

## 2023-10-31 LAB
25(OH)D3 SERPL-MCNC: 64.4 NG/ML (ref 30–100)
ANION GAP SERPL CALC-SCNC: 3 MMOL/L (ref 2–11)
BUN SERPL-MCNC: 11 MG/DL (ref 8–23)
CALCIUM SERPL-MCNC: 9.1 MG/DL (ref 8.3–10.4)
CHLORIDE SERPL-SCNC: 109 MMOL/L (ref 101–110)
CO2 SERPL-SCNC: 29 MMOL/L (ref 21–32)
CREAT SERPL-MCNC: 1.1 MG/DL (ref 0.8–1.5)
GLUCOSE SERPL-MCNC: 102 MG/DL (ref 65–100)
PHOSPHATE SERPL-MCNC: 3.8 MG/DL (ref 2.3–3.7)
POTASSIUM SERPL-SCNC: 3.6 MMOL/L (ref 3.5–5.1)
SODIUM SERPL-SCNC: 141 MMOL/L (ref 133–143)

## 2023-11-01 ENCOUNTER — TELEPHONE (OUTPATIENT)
Dept: INTERNAL MEDICINE CLINIC | Facility: CLINIC | Age: 73
End: 2023-11-01

## 2023-11-01 NOTE — TELEPHONE ENCOUNTER
----- Message from Katlyn Coe MD sent at 11/1/2023  8:20 AM EDT -----  Regarding: FW:   Labs okay for Prolia injection.   Katlyn Coe MD   ----- Message -----  From: Mary Lou Thrasher Incoming Herman [employer]  Sent: 10/31/2023   9:25 PM EDT  To: Katlyn Coe MD

## 2023-11-08 PROCEDURE — 93298 REM INTERROG DEV EVAL SCRMS: CPT | Performed by: INTERNAL MEDICINE

## 2023-11-08 PROCEDURE — G2066 INTER DEVC REMOTE 30D: HCPCS | Performed by: INTERNAL MEDICINE

## 2023-11-22 ENCOUNTER — TELEPHONE (OUTPATIENT)
Age: 73
End: 2023-11-22

## 2023-11-22 DIAGNOSIS — C61 PROSTATE CANCER (HCC): Primary | ICD-10-CM

## 2023-11-22 NOTE — TELEPHONE ENCOUNTER
Murj alert for one tachy episode lasting 55 seconds w/ V rates in the 170's. Unable to discern true afib. Possible AT/ Sinus tach. Hx CVA on ASA 81mg         IMPRESSION:     Mr. Mere Mello presents today for follow-up. He is stable from a cardiac standpoint today. He is loop recorder showed no significant dysrhythmia. He has had no cardiac symptoms. We will continue with his current therapy, can see him back in 1 year for regular follow-up or sooner if new issues arise. No follow-ups on file. Thank you for allowing me to participate in this patient's care. Please call or contact me if there are any questions or concerns regarding the above.        Scarlett Cannon III, MD  10/02/23  9:12 AM

## 2023-11-27 NOTE — TELEPHONE ENCOUNTER
Yasmany Hernandez MD  You 5 days ago       If he's asymptomatic will make no changes and cont to monitor.    Thanks   frandy

## 2023-11-28 ENCOUNTER — HOSPITAL ENCOUNTER (OUTPATIENT)
Dept: LAB | Age: 73
Discharge: HOME | End: 2023-12-01
Payer: MEDICARE

## 2023-11-28 DIAGNOSIS — C61 PROSTATE CANCER (HCC): ICD-10-CM

## 2023-11-28 PROCEDURE — 84153 ASSAY OF PSA TOTAL: CPT

## 2023-11-28 PROCEDURE — 36415 COLL VENOUS BLD VENIPUNCTURE: CPT

## 2023-11-30 LAB — PSA SERPL DL<=0.01 NG/ML-MCNC: <0.006 NG/ML (ref 0–4)

## 2023-12-01 ENCOUNTER — OFFICE VISIT (OUTPATIENT)
Dept: INTERNAL MEDICINE CLINIC | Facility: CLINIC | Age: 73
End: 2023-12-01
Payer: MEDICARE

## 2023-12-01 ENCOUNTER — OFFICE VISIT (OUTPATIENT)
Dept: ONCOLOGY | Age: 73
End: 2023-12-01

## 2023-12-01 VITALS
DIASTOLIC BLOOD PRESSURE: 83 MMHG | HEIGHT: 66 IN | WEIGHT: 150.6 LBS | RESPIRATION RATE: 16 BRPM | BODY MASS INDEX: 24.2 KG/M2 | TEMPERATURE: 98 F | OXYGEN SATURATION: 96 % | SYSTOLIC BLOOD PRESSURE: 151 MMHG | HEART RATE: 62 BPM

## 2023-12-01 VITALS
OXYGEN SATURATION: 97 % | TEMPERATURE: 97 F | DIASTOLIC BLOOD PRESSURE: 82 MMHG | HEIGHT: 66 IN | HEART RATE: 78 BPM | WEIGHT: 153 LBS | BODY MASS INDEX: 24.59 KG/M2 | SYSTOLIC BLOOD PRESSURE: 122 MMHG

## 2023-12-01 DIAGNOSIS — J06.9 URI, ACUTE: Primary | ICD-10-CM

## 2023-12-01 DIAGNOSIS — C61 PROSTATE CANCER (HCC): Primary | ICD-10-CM

## 2023-12-01 PROCEDURE — 3074F SYST BP LT 130 MM HG: CPT | Performed by: INTERNAL MEDICINE

## 2023-12-01 PROCEDURE — 99213 OFFICE O/P EST LOW 20 MIN: CPT | Performed by: INTERNAL MEDICINE

## 2023-12-01 PROCEDURE — 3078F DIAST BP <80 MM HG: CPT | Performed by: INTERNAL MEDICINE

## 2023-12-01 PROCEDURE — 3079F DIAST BP 80-89 MM HG: CPT | Performed by: UROLOGY

## 2023-12-01 PROCEDURE — 1123F ACP DISCUSS/DSCN MKR DOCD: CPT | Performed by: INTERNAL MEDICINE

## 2023-12-01 RX ORDER — BENZONATATE 200 MG/1
200 CAPSULE ORAL 3 TIMES DAILY PRN
Qty: 30 CAPSULE | Refills: 0 | Status: SHIPPED | OUTPATIENT
Start: 2023-12-01 | End: 2023-12-08

## 2023-12-01 RX ORDER — SILDENAFIL 50 MG/1
50 TABLET, FILM COATED ORAL DAILY PRN
Qty: 30 TABLET | Refills: 1 | Status: SHIPPED | OUTPATIENT
Start: 2023-12-01

## 2023-12-01 RX ORDER — SULFAMETHOXAZOLE AND TRIMETHOPRIM 800; 160 MG/1; MG/1
1 TABLET ORAL 2 TIMES DAILY
Qty: 20 TABLET | Refills: 0 | Status: SHIPPED | OUTPATIENT
Start: 2023-12-01 | End: 2023-12-11

## 2023-12-01 RX ORDER — PREDNISONE 20 MG/1
20 TABLET ORAL DAILY
Qty: 5 TABLET | Refills: 0 | Status: SHIPPED | OUTPATIENT
Start: 2023-12-01 | End: 2023-12-06

## 2023-12-01 ASSESSMENT — PATIENT HEALTH QUESTIONNAIRE - PHQ9
2. FEELING DOWN, DEPRESSED OR HOPELESS: 0
SUM OF ALL RESPONSES TO PHQ QUESTIONS 1-9: 0
SUM OF ALL RESPONSES TO PHQ9 QUESTIONS 1 & 2: 0
1. LITTLE INTEREST OR PLEASURE IN DOING THINGS: 0
SUM OF ALL RESPONSES TO PHQ QUESTIONS 1-9: 0

## 2023-12-01 NOTE — PROGRESS NOTES
Essential hypertension     GERD (gastroesophageal reflux disease)     on meds    Hiatal hernia     History of COVID-19 04/2020    no hospitalization required    History of TIA (transient ischemic attack) 2017    x 3 TIA; on plavix (last TIA 4/1/2023)    Hypercholesterolemia     Hyperlipidemia     Inguinal hernia     resolved with surgery    Prostate cancer ([address])     Psoriasis     Scoliosis     Senile osteoporosis     Status post placement of implantable loop recorder 05/25/2023    Medtronic Loop recorder placed     Social History     Socioeconomic History    Marital status:      Spouse name: None    Number of children: None    Years of education: None    Highest education level: None   Tobacco Use    Smoking status: Never     Passive exposure: Past    Smokeless tobacco: Never   Vaping Use    Vaping Use: Never used   Substance and Sexual Activity    Alcohol use:  Yes     Alcohol/week: 1.0 standard drink of alcohol     Types: 1 Cans of beer per week    Drug use: No    Sexual activity: Not Currently     Social Determinants of Health     Financial Resource Strain: Unknown (3/21/2023)    Overall Financial Resource Strain (CARDIA)     Difficulty of Paying Living Expenses: Patient refused   Transportation Needs: Unknown (3/21/2023)    PRAPARE - Transportation     Lack of Transportation (Non-Medical): Patient refused   Physical Activity: Insufficiently Active (9/22/2023)    Exercise Vital Sign     Days of Exercise per Week: 2 days     Minutes of Exercise per Session: 30 min   Housing Stability: Unknown (3/21/2023)    Housing Stability Vital Sign     Unstable Housing in the Last Year: Patient refused     Past Surgical History:   Procedure Laterality Date    CATARACT REMOVAL Left 02/24/2015    with lens implant    CATARACT REMOVAL Right 02/03/2015    with lens implant    COLONOSCOPY      EP DEVICE PROCEDURE N/A 05/25/2023    Loop recorder insert performed by Moses Lundy MD at [facility]    HERNIA

## 2023-12-01 NOTE — PROGRESS NOTES
[facility] On[facility]  [address] [facility], [address] [place] [address]  [phone]        Mr. Herman Sunshine is a 75 y.o. male with a diagnosis of PCa s/p RALP with BPLND on 7/18/23, PROSTATIC ADENOCARCINOMA (RONALD 3 + 4 = 7 (WITH FOCAL TERTIARY 5)), pT2N0 (0/18 nodes). INTERVAL HISTORY:  Here today for follow-up evaluation following prostatectomy on 7/18/2023. He has not acute complaints at today's visit. His ultrasensitive PSA was undetectable at <0.006 on 11/28/2023. Patient states that his urinary incontinence has resolved and is no longer requiring use of pads. Remains totally dry at night. He voids with a strong stream.    He continues using sildenafil 40 mg daily increasing dose as needed. He states he is 100 mg a few days ago with quicker erection however states that not firm enough for intercourse. From last note (9/1/2023):  Here today for follow-up after his prostatectomy on 7/18/2023. He has no complaints. His ultrasensitive PSA was undetectable at 0.007 on 8/28/2023. His urinary incontinence has improved greatly. He is using roughly 2 pads a day. He is almost totally dry at night now. He voids with a strong stream and can stop his stream voluntarily. He has not had any erectile function but is interested in a trial of sildenafil. Past medical, family and social histories, as well as medications and allergies, were reviewed and updated in the medical record as appropriate.     PMH:     Past Medical History:   Diagnosis Date    Age related osteoporosis     Congenital tracheoesophageal fistula, esophageal atresia and stenosis     pt denies- not aware of    Current use of long term anticoagulation     Plavix and Aspirin    Esophageal reflux     Essential hypertension     GERD (gastroesophageal reflux disease)     on meds    Hiatal hernia     History of COVID-19 04/2020    no hospitalization required    History of TIA (transient ischemic

## 2023-12-04 DIAGNOSIS — C61 PROSTATE CANCER (HCC): Primary | ICD-10-CM

## 2023-12-26 RX ORDER — AMLODIPINE BESYLATE 2.5 MG/1
TABLET ORAL
Qty: 90 TABLET | Refills: 3 | Status: SHIPPED | OUTPATIENT
Start: 2023-12-26

## 2023-12-26 RX ORDER — SIMVASTATIN 20 MG
TABLET ORAL
Qty: 90 TABLET | Refills: 3 | Status: SHIPPED | OUTPATIENT
Start: 2023-12-26

## 2024-01-04 ENCOUNTER — PATIENT MESSAGE (OUTPATIENT)
Dept: INTERNAL MEDICINE CLINIC | Facility: CLINIC | Age: 74
End: 2024-01-04

## 2024-01-08 NOTE — TELEPHONE ENCOUNTER
From: Mekhi Carrillo  To: Dr. Vinicio Junior  Sent: 1/4/2024 3:59 PM EST  Subject: Prolia shot appointments for 2024    I made my appts last October for this year at [facility]. They are May 2 and November 7 and I will need blood work for each and send results to them. Also will need prior authorization for this year from [facility] when it is convenient for you. Thank you.

## 2024-01-15 PROCEDURE — 93298 REM INTERROG DEV EVAL SCRMS: CPT | Performed by: INTERNAL MEDICINE

## 2024-01-18 RX ORDER — DENOSUMAB 60 MG/ML
60 INJECTION SUBCUTANEOUS
Qty: 1 EACH | Refills: 1 | Status: SHIPPED | OUTPATIENT
Start: 2024-01-18

## 2024-01-18 RX ORDER — DENOSUMAB 60 MG/ML
60 INJECTION SUBCUTANEOUS
Qty: 1 EACH | Refills: 1 | Status: SHIPPED | OUTPATIENT
Start: 2024-01-18 | End: 2024-01-18 | Stop reason: SDUPTHER

## 2024-01-18 NOTE — TELEPHONE ENCOUNTER
This should have been a print rx. I dont think it gets filled at Local pharmacy.   I think order is sent to [facility]fusion [facility].   Call [facility] and cancel rx

## 2024-03-07 PROCEDURE — 93298 REM INTERROG DEV EVAL SCRMS: CPT | Performed by: INTERNAL MEDICINE

## 2024-03-26 ENCOUNTER — OFFICE VISIT (OUTPATIENT)
Dept: INTERNAL MEDICINE CLINIC | Facility: CLINIC | Age: 74
End: 2024-03-26
Payer: MEDICARE

## 2024-03-26 VITALS
OXYGEN SATURATION: 100 % | DIASTOLIC BLOOD PRESSURE: 79 MMHG | HEIGHT: 66 IN | SYSTOLIC BLOOD PRESSURE: 146 MMHG | TEMPERATURE: 98.4 F | BODY MASS INDEX: 24.27 KG/M2 | HEART RATE: 54 BPM | WEIGHT: 151 LBS

## 2024-03-26 DIAGNOSIS — K21.9 GASTROESOPHAGEAL REFLUX DISEASE WITHOUT ESOPHAGITIS: ICD-10-CM

## 2024-03-26 DIAGNOSIS — I10 ESSENTIAL HYPERTENSION: Primary | ICD-10-CM

## 2024-03-26 DIAGNOSIS — E78.2 MIXED HYPERLIPIDEMIA: ICD-10-CM

## 2024-03-26 DIAGNOSIS — Z86.73 HISTORY OF STROKE: ICD-10-CM

## 2024-03-26 DIAGNOSIS — C61 PROSTATE CANCER (HCC): ICD-10-CM

## 2024-03-26 DIAGNOSIS — M81.0 AGE-RELATED OSTEOPOROSIS WITHOUT CURRENT PATHOLOGICAL FRACTURE: ICD-10-CM

## 2024-03-26 PROCEDURE — 1123F ACP DISCUSS/DSCN MKR DOCD: CPT | Performed by: INTERNAL MEDICINE

## 2024-03-26 PROCEDURE — 3078F DIAST BP <80 MM HG: CPT | Performed by: INTERNAL MEDICINE

## 2024-03-26 PROCEDURE — 3077F SYST BP >= 140 MM HG: CPT | Performed by: INTERNAL MEDICINE

## 2024-03-26 PROCEDURE — 99214 OFFICE O/P EST MOD 30 MIN: CPT | Performed by: INTERNAL MEDICINE

## 2024-03-26 RX ORDER — PANTOPRAZOLE SODIUM 40 MG/1
40 TABLET, DELAYED RELEASE ORAL
Qty: 180 TABLET | Refills: 3 | Status: SHIPPED | OUTPATIENT
Start: 2024-03-26

## 2024-03-26 SDOH — ECONOMIC STABILITY: INCOME INSECURITY: HOW HARD IS IT FOR YOU TO PAY FOR THE VERY BASICS LIKE FOOD, HOUSING, MEDICAL CARE, AND HEATING?: NOT HARD AT ALL

## 2024-03-26 SDOH — ECONOMIC STABILITY: FOOD INSECURITY: WITHIN THE PAST 12 MONTHS, YOU WORRIED THAT YOUR FOOD WOULD RUN OUT BEFORE YOU GOT MONEY TO BUY MORE.: NEVER TRUE

## 2024-03-26 SDOH — ECONOMIC STABILITY: FOOD INSECURITY: WITHIN THE PAST 12 MONTHS, THE FOOD YOU BOUGHT JUST DIDN'T LAST AND YOU DIDN'T HAVE MONEY TO GET MORE.: NEVER TRUE

## 2024-03-26 NOTE — PROGRESS NOTES
03/26/2024   Location:[facility] PHYSICIAN SERVICES  [facility] INTER[facility] MEDICINE  [place]  Patient #:  [id number]  YOB: 1950        History of Present Illness     Chief Complaint   Patient presents with    6 Month Follow-Up     6 month follow-up     Hypertension    Osteoporosis       Mr. Carrillo is a 74 y.o. male  who presents for Follow up on chronic medical issues. There is compliance and tolerance with medications.    Chronic active medical issus HTN, HLD, GERD, history of TIA, osteoporosis.  Last year underwent surgical treatment for prostate cancer. Doing well. On Prolia for osteoporosis.  HTN controlled on Norvasc  HLD controlled on Zocor       Last Labs  CBC:   Lab Results   Component Value Date/Time    WBC 9.7 07/19/2023 08:10 PM    RBC 3.87 07/19/2023 08:10 PM    HGB 11.8 07/19/2023 08:10 PM    HCT 36.0 07/19/2023 08:10 PM    MCV 93.0 07/19/2023 08:10 PM    MCH 30.5 07/19/2023 08:10 PM    MCHC 32.8 07/19/2023 08:10 PM    RDW 14.1 07/19/2023 08:10 PM     07/19/2023 08:10 PM    MPV 8.8 07/19/2023 08:10 PM     CMP:    Lab Results   Component Value Date/Time     10/31/2023 02:18 PM    K 3.6 10/31/2023 02:18 PM     10/31/2023 02:18 PM    CO2 29 10/31/2023 02:18 PM    BUN 11 10/31/2023 02:18 PM    CREATININE 1.10 10/31/2023 02:18 PM    GFRAA >60 09/21/2022 08:05 AM    AGRATIO 1.1 01/25/2023 09:31 AM    AGRATIO 1.8 09/13/2021 10:25 AM    LABGLOM >60 10/31/2023 02:18 PM    LABGLOM 78 03/18/2022 10:31 AM    GLUCOSE 102 10/31/2023 02:18 PM    PROT 7.4 01/25/2023 09:31 AM    LABALBU 3.9 01/25/2023 09:31 AM    CALCIUM 9.1 10/31/2023 02:18 PM    BILITOT 0.5 01/25/2023 09:31 AM    ALKPHOS 63 01/25/2023 09:31 AM    ALKPHOS 66 09/13/2021 10:25 AM    AST 21 01/25/2023 09:31 AM    ALT 29 01/25/2023 09:31 AM     HgBA1c:  No results found for: \"LABA1C\"  Microalbumen/Creatinine ratio:  No components found for: \"RUCREAT\"  FLP:    Lab Results   Component Value Date/Time    TRIG 77 03/21/2023

## 2024-04-02 ENCOUNTER — TELEPHONE (OUTPATIENT)
Dept: INTERNAL MEDICINE CLINIC | Facility: CLINIC | Age: 74
End: 2024-04-02

## 2024-04-02 PROBLEM — Z86.73 HISTORY OF STROKE: Status: ACTIVE | Noted: 2024-04-02

## 2024-04-04 DIAGNOSIS — I10 ESSENTIAL HYPERTENSION: Primary | ICD-10-CM

## 2024-04-04 DIAGNOSIS — E78.2 MIXED HYPERLIPIDEMIA: ICD-10-CM

## 2024-04-04 DIAGNOSIS — Z86.73 HISTORY OF TIA (TRANSIENT ISCHEMIC ATTACK): ICD-10-CM

## 2024-04-17 ENCOUNTER — TELEPHONE (OUTPATIENT)
Dept: ONCOLOGY | Age: 74
End: 2024-04-17

## 2024-04-17 NOTE — TELEPHONE ENCOUNTER
Called patient and informed that paperwork that was left to be filled out is complete and will be at the  on the second floor waiting for him to . Informed patient he could get it any time Mon-Fri from 8-4. Patient expressed understanding.

## 2024-04-18 RX ORDER — FAMOTIDINE 20 MG/1
20 TABLET, FILM COATED ORAL DAILY
Qty: 90 TABLET | Refills: 3 | Status: SHIPPED | OUTPATIENT
Start: 2024-04-18

## 2024-04-29 ENCOUNTER — NURSE ONLY (OUTPATIENT)
Dept: INTERNAL MEDICINE CLINIC | Facility: CLINIC | Age: 74
End: 2024-04-29

## 2024-04-29 DIAGNOSIS — E78.2 MIXED HYPERLIPIDEMIA: ICD-10-CM

## 2024-04-29 DIAGNOSIS — Z86.73 HISTORY OF TIA (TRANSIENT ISCHEMIC ATTACK): ICD-10-CM

## 2024-04-29 DIAGNOSIS — I10 ESSENTIAL HYPERTENSION: ICD-10-CM

## 2024-04-29 LAB
ALBUMIN SERPL-MCNC: 4 G/DL (ref 3.2–4.6)
ALBUMIN/GLOB SERPL: 1.3 (ref 1–1.9)
ALP SERPL-CCNC: 69 U/L (ref 40–129)
ALT SERPL-CCNC: 22 U/L (ref 12–65)
ANION GAP SERPL CALC-SCNC: 10 MMOL/L (ref 9–18)
AST SERPL-CCNC: 29 U/L (ref 15–37)
BASOPHILS # BLD: 0.1 K/UL (ref 0–0.2)
BASOPHILS NFR BLD: 1 % (ref 0–2)
BILIRUB SERPL-MCNC: 0.5 MG/DL (ref 0–1.2)
BUN SERPL-MCNC: 11 MG/DL (ref 8–23)
CALCIUM SERPL-MCNC: 9.3 MG/DL (ref 8.8–10.2)
CHLORIDE SERPL-SCNC: 103 MMOL/L (ref 98–107)
CHOLEST SERPL-MCNC: 153 MG/DL (ref 0–200)
CO2 SERPL-SCNC: 26 MMOL/L (ref 20–28)
CREAT SERPL-MCNC: 1.05 MG/DL (ref 0.8–1.3)
DIFFERENTIAL METHOD BLD: ABNORMAL
EOSINOPHIL # BLD: 0.3 K/UL (ref 0–0.8)
EOSINOPHIL NFR BLD: 6 % (ref 0.5–7.8)
ERYTHROCYTE [DISTWIDTH] IN BLOOD BY AUTOMATED COUNT: 13.1 % (ref 11.9–14.6)
GLOBULIN SER CALC-MCNC: 3.2 G/DL (ref 2.3–3.5)
GLUCOSE SERPL-MCNC: 106 MG/DL (ref 70–99)
HCT VFR BLD AUTO: 41.8 % (ref 41.1–50.3)
HDLC SERPL-MCNC: 44 MG/DL (ref 40–60)
HDLC SERPL: 3.5 (ref 0–5)
HGB BLD-MCNC: 13.7 G/DL (ref 13.6–17.2)
IMM GRANULOCYTES # BLD AUTO: 0 K/UL (ref 0–0.5)
IMM GRANULOCYTES NFR BLD AUTO: 0 % (ref 0–5)
LYMPHOCYTES # BLD: 2.3 K/UL (ref 0.5–4.6)
LYMPHOCYTES NFR BLD: 40 % (ref 13–44)
MCH RBC QN AUTO: 30.7 PG (ref 26.1–32.9)
MCHC RBC AUTO-ENTMCNC: 32.8 G/DL (ref 31.4–35)
MCV RBC AUTO: 93.7 FL (ref 82–102)
MONOCYTES # BLD: 0.7 K/UL (ref 0.1–1.3)
MONOCYTES NFR BLD: 12 % (ref 4–12)
NEUTS SEG # BLD: 2.4 K/UL (ref 1.7–8.2)
NEUTS SEG NFR BLD: 41 % (ref 43–78)
NRBC # BLD: 0 K/UL (ref 0–0.2)
PLATELET # BLD AUTO: 195 K/UL (ref 150–450)
PMV BLD AUTO: 9.8 FL (ref 9.4–12.3)
POTASSIUM SERPL-SCNC: 4.1 MMOL/L (ref 3.5–5.1)
PROT SERPL-MCNC: 7.2 G/DL (ref 6.3–8.2)
RBC # BLD AUTO: 4.46 M/UL (ref 4.23–5.6)
SODIUM SERPL-SCNC: 139 MMOL/L (ref 136–145)
TRIGL SERPL-MCNC: 73 MG/DL (ref 0–150)
TSH W FREE THYROID IF ABNORMAL: 2.14 UIU/ML (ref 0.27–4.2)
VLDLC SERPL CALC-MCNC: 15 MG/DL (ref 6–23)
WBC # BLD AUTO: 5.7 K/UL (ref 4.3–11.1)

## 2024-04-30 PROCEDURE — 93298 REM INTERROG DEV EVAL SCRMS: CPT | Performed by: INTERNAL MEDICINE

## 2024-05-01 ENCOUNTER — TELEPHONE (OUTPATIENT)
Dept: INTERNAL MEDICINE CLINIC | Facility: CLINIC | Age: 74
End: 2024-05-01

## 2024-05-01 NOTE — TELEPHONE ENCOUNTER
Ms. Perez has called about Sergeant Melchor's Prolia shot, and he needs the test results that were done on Friday faxed to the Infusion center in at [facility]. This will be done tomorrow. They need this faxed to them today to get it approved. Mr. Perez had stated that we have the fax number.

## 2024-05-02 NOTE — RESULT ENCOUNTER NOTE
Labs were stable. Borderline glucose. Cholesterol was good.  Avoid sugary foods and drinks   Limit carbs. Exercise aiming for 150 minutes of ezercise aweek.

## 2024-06-05 NOTE — PROGRESS NOTES
Urologic Oncology  [facility] Hematology & Oncology  [address] [place] [id number]  [phone]        Mr. Mekhi Carrillo is a 74 y.o. male with a diagnosis of PCa s/p RALP with BPLND on 7/18/23, PROSTATIC ADENOCARCINOMA (RONALD 3 + 4 = 7 (WITH FOCAL TERTIARY 5)), pT2N0 (0/18 nodes).    INTERVAL HISTORY:    Patient returns today for follow-up evaluation of his GS 3+4=7 prostate cancer with focal tertiary 5 disease status post RALP w BPLND on 7/18/2023.  He has been doing well since last visit denies any lower urinary tract symptoms.  No dysuria or hematuria.  He is voiding well.  He denies any significant incontinence though he does have some urinary urgency.  He is aware of this and tries his best to fully empty his bladder prior to any periods where he may have difficult time getting to the restroom.    Patient has discontinued use of sildenafil due to insignificant erection for intercourse.  He has noticed his best benefit from a erection vacuum device.  He has not met with Jacquie Senior to discuss Trimix therapy and is not interested at this time.      From previous note on 12/1/23: Here today for follow-up evaluation following prostatectomy on 7/18/2023.  He has not acute complaints at today's visit.  His ultrasensitive PSA was undetectable at <0.006 on 11/28/2023.     Patient states that his urinary incontinence has resolved and is no longer requiring use of pads.  Remains totally dry at night.  He voids with a strong stream.     He continues using sildenafil 40 mg daily increasing dose as needed.  He states he is 100 mg a few days ago with quicker erection however states that not firm enough for intercourse.    Past medical, family and social histories, as well as medications and allergies, were reviewed and updated in the medical record as appropriate.    PMH:     Past Medical History:   Diagnosis Date    Age related osteoporosis     Congenital tracheoesophageal fistula, esophageal

## 2024-06-07 ENCOUNTER — HOSPITAL ENCOUNTER (OUTPATIENT)
Dept: LAB | Age: 74
End: 2024-06-07
Payer: MEDICARE

## 2024-06-07 DIAGNOSIS — C61 PROSTATE CANCER (HCC): ICD-10-CM

## 2024-06-07 PROCEDURE — 36415 COLL VENOUS BLD VENIPUNCTURE: CPT

## 2024-06-07 PROCEDURE — 84153 ASSAY OF PSA TOTAL: CPT

## 2024-06-08 LAB — PSA SERPL DL<=0.01 NG/ML-MCNC: <0.006 NG/ML (ref 0–4)

## 2024-06-10 ENCOUNTER — OFFICE VISIT (OUTPATIENT)
Dept: ONCOLOGY | Age: 74
End: 2024-06-10
Payer: MEDICARE

## 2024-06-10 VITALS
OXYGEN SATURATION: 99 % | HEART RATE: 54 BPM | DIASTOLIC BLOOD PRESSURE: 80 MMHG | WEIGHT: 149.1 LBS | TEMPERATURE: 97.9 F | SYSTOLIC BLOOD PRESSURE: 129 MMHG | RESPIRATION RATE: 16 BRPM | HEIGHT: 66 IN | BODY MASS INDEX: 23.96 KG/M2

## 2024-06-10 DIAGNOSIS — N52.31 ERECTILE DYSFUNCTION AFTER RADICAL PROSTATECTOMY: ICD-10-CM

## 2024-06-10 DIAGNOSIS — C61 PROSTATE CANCER (HCC): Primary | ICD-10-CM

## 2024-06-10 PROCEDURE — 3074F SYST BP LT 130 MM HG: CPT | Performed by: UROLOGY

## 2024-06-10 PROCEDURE — 1123F ACP DISCUSS/DSCN MKR DOCD: CPT | Performed by: UROLOGY

## 2024-06-10 PROCEDURE — 3079F DIAST BP 80-89 MM HG: CPT | Performed by: UROLOGY

## 2024-06-10 PROCEDURE — 99213 OFFICE O/P EST LOW 20 MIN: CPT | Performed by: UROLOGY

## 2024-06-10 ASSESSMENT — PATIENT HEALTH QUESTIONNAIRE - PHQ9
2. FEELING DOWN, DEPRESSED OR HOPELESS: NOT AT ALL
1. LITTLE INTEREST OR PLEASURE IN DOING THINGS: NOT AT ALL
SUM OF ALL RESPONSES TO PHQ QUESTIONS 1-9: 0
SUM OF ALL RESPONSES TO PHQ9 QUESTIONS 1 & 2: 0
SUM OF ALL RESPONSES TO PHQ QUESTIONS 1-9: 0

## 2024-06-10 NOTE — PATIENT INSTRUCTIONS
Patient Information from Today's Visit    The members of your Oncology Medical Home are listed below:    Physician Provider: Shemar Somers, Urologic Oncologist  Advanced Practice Clinician: LUCY Ann  Registered Nurse:   Nurse Navigator: Melodie FINNEGAN RN  Medical Assistant: Vero ELLIOTT MA  :Annie DEE  Supportive Care Services:     Diagnosis: Prostate Cancer    Follow Up Instructions:   PSA looks great!   We will need to do another office visit with labs in 6 months.      Treatment Summary has been discussed and given to patient:No      Current Labs:           Please refer to After Visit Summary or [employer]iset for upcoming appointment information. Please call our office for rescheduling needs at least 24 hours before your scheduled appointment time.If you have any questions regarding your upcoming schedule please reach out to your care team through lettrs or call [phone].     Please notify your assigned Nurse Navigator of any unplanned hospital admissions or Emergency Room visits within 24 hours of discharge.    -------------------------------------------------------------------------------------------------------------------  Please call our office at [phone] if you have any  of the following symptoms:   Fever of 100.5 or greater  Chills  Shortness of breath  Swelling or pain in one leg    After office hours an answering service is available and will contact a provider for emergencies or if you are experiencing any of the above symptoms.        Vero ELLIOTT MA

## 2024-07-05 PROCEDURE — 93298 REM INTERROG DEV EVAL SCRMS: CPT | Performed by: INTERNAL MEDICINE

## 2024-08-05 PROCEDURE — 93298 REM INTERROG DEV EVAL SCRMS: CPT | Performed by: INTERNAL MEDICINE

## 2024-09-18 PROCEDURE — 93298 REM INTERROG DEV EVAL SCRMS: CPT | Performed by: INTERNAL MEDICINE

## 2024-10-01 ENCOUNTER — OFFICE VISIT (OUTPATIENT)
Age: 74
End: 2024-10-01
Payer: MEDICARE

## 2024-10-01 VITALS
BODY MASS INDEX: 23.3 KG/M2 | SYSTOLIC BLOOD PRESSURE: 136 MMHG | WEIGHT: 145 LBS | DIASTOLIC BLOOD PRESSURE: 68 MMHG | HEART RATE: 63 BPM | HEIGHT: 66 IN

## 2024-10-01 DIAGNOSIS — G45.9 TIA (TRANSIENT ISCHEMIC ATTACK): Primary | ICD-10-CM

## 2024-10-01 DIAGNOSIS — R00.2 PALPITATIONS: ICD-10-CM

## 2024-10-01 DIAGNOSIS — I10 ESSENTIAL HYPERTENSION: ICD-10-CM

## 2024-10-01 PROCEDURE — 3075F SYST BP GE 130 - 139MM HG: CPT | Performed by: INTERNAL MEDICINE

## 2024-10-01 PROCEDURE — 93000 ELECTROCARDIOGRAM COMPLETE: CPT | Performed by: INTERNAL MEDICINE

## 2024-10-01 PROCEDURE — 3078F DIAST BP <80 MM HG: CPT | Performed by: INTERNAL MEDICINE

## 2024-10-01 PROCEDURE — 1123F ACP DISCUSS/DSCN MKR DOCD: CPT | Performed by: INTERNAL MEDICINE

## 2024-10-01 PROCEDURE — 99214 OFFICE O/P EST MOD 30 MIN: CPT | Performed by: INTERNAL MEDICINE

## 2024-10-01 ASSESSMENT — ENCOUNTER SYMPTOMS
ORTHOPNEA: 0
SHORTNESS OF BREATH: 0
ABDOMINAL PAIN: 0

## 2024-10-01 NOTE — PROGRESS NOTES
Systems   Constitutional: Negative for chills, diaphoresis and fever.   HENT:  Negative for hearing loss.    Eyes:  Negative for visual disturbance.   Cardiovascular:  Positive for irregular heartbeat and palpitations. Negative for chest pain, claudication, dyspnea on exertion, leg swelling, near-syncope, orthopnea and paroxysmal nocturnal dyspnea.   Respiratory:  Negative for shortness of breath.    Hematologic/Lymphatic: Does not bruise/bleed easily.   Gastrointestinal:  Negative for abdominal pain.   Genitourinary:  Negative for dysuria.   Neurological:  Negative for focal weakness.   Psychiatric/Behavioral:  Negative for suicidal ideas.           PHYSICAL EXAM:   /68   Pulse 63   Ht 1.676 m (5' 6\")   Wt 65.8 kg (145 lb)   BMI 23.40 kg/m²      Wt Readings from Last 3 Encounters:   10/01/24 65.8 kg (145 lb)   06/10/24 67.6 kg (149 lb 1.6 oz)   03/26/24 68.5 kg (151 lb)     BP Readings from Last 3 Encounters:   10/01/24 136/68   06/10/24 129/80   03/26/24 (!) 146/79     Pulse Readings from Last 3 Encounters:   10/01/24 63   06/10/24 54   03/26/24 54           Physical Exam  Vitals reviewed.   Constitutional:       General: He is not in acute distress.     Appearance: Normal appearance.   HENT:      Head: Normocephalic and atraumatic.   Eyes:      General: No scleral icterus.  Neck:      Vascular: No carotid bruit.   Cardiovascular:      Rate and Rhythm: Normal rate and regular rhythm.      Heart sounds: No murmur heard.  Pulmonary:      Breath sounds: Normal breath sounds.   Abdominal:      General: Abdomen is flat.      Palpations: Abdomen is soft.   Musculoskeletal:         General: No swelling.      Cervical back: Neck supple.   Skin:     General: Skin is warm and dry.   Neurological:      Mental Status: He is alert and oriented to person, place, and time.   Psychiatric:         Mood and Affect: Mood normal.         Medical problems and test results were reviewed with the patient today.     DATA

## 2024-10-16 ENCOUNTER — TELEPHONE (OUTPATIENT)
Dept: INTERNAL MEDICINE CLINIC | Facility: CLINIC | Age: 74
End: 2024-10-16

## 2024-10-16 DIAGNOSIS — M81.0 AGE-RELATED OSTEOPOROSIS WITHOUT CURRENT PATHOLOGICAL FRACTURE: Primary | ICD-10-CM

## 2024-10-16 NOTE — TELEPHONE ENCOUNTER
----- Message from Amira GARDNER sent at 10/16/2024 10:05 AM EDT -----  Regarding: [facility] Appointment Request  E[facility] Appointment Request    Patient needs appointment for E[facility] Appointment Type: Existing Condition Follow Up.    Patient Requested Dates(s): Monday, Wednesday and Friday ( All day ). Tuesday and Friday ( free Every morning)  Patient Requested Time: Monday, Wednesday and Friday ( All day ). Tuesday and Thursday ( free Every morning)  Provider Name: Vinicio Junior MD    Reason for Appointment Request: Established Patient - No appointments available during search    Additional Information: The patient called in because patient wants to setup his 6 months checkup but there is no available schedule    --------------------------------------------------------------------------------------------------------------------------    Relationship to Patient: Spouse/Partner - Scarlet     Call Back Information: OK to leave message on voicemail  Preferred Call Back Number: Phone [phone] / [phone]

## 2024-10-16 NOTE — TELEPHONE ENCOUNTER
Pt states he needs an order for bloodwork for Prolia sent to [facility] by November 5. States he was told if they do not receive orders by that date he will not be able to get his shot.    Call back # [phone]

## 2024-10-28 ENCOUNTER — OFFICE VISIT (OUTPATIENT)
Dept: INTERNAL MEDICINE CLINIC | Facility: CLINIC | Age: 74
End: 2024-10-28
Payer: MEDICARE

## 2024-10-28 ENCOUNTER — TELEPHONE (OUTPATIENT)
Dept: INTERNAL MEDICINE CLINIC | Facility: CLINIC | Age: 74
End: 2024-10-28

## 2024-10-28 VITALS
TEMPERATURE: 98.1 F | OXYGEN SATURATION: 97 % | DIASTOLIC BLOOD PRESSURE: 74 MMHG | RESPIRATION RATE: 18 BRPM | HEIGHT: 64 IN | SYSTOLIC BLOOD PRESSURE: 122 MMHG | WEIGHT: 144.8 LBS | BODY MASS INDEX: 24.72 KG/M2 | HEART RATE: 65 BPM

## 2024-10-28 DIAGNOSIS — M81.0 AGE-RELATED OSTEOPOROSIS WITHOUT CURRENT PATHOLOGICAL FRACTURE: Primary | ICD-10-CM

## 2024-10-28 DIAGNOSIS — Z86.73 HISTORY OF TIA (TRANSIENT ISCHEMIC ATTACK): ICD-10-CM

## 2024-10-28 DIAGNOSIS — E78.2 MIXED HYPERLIPIDEMIA: ICD-10-CM

## 2024-10-28 DIAGNOSIS — I10 ESSENTIAL HYPERTENSION: ICD-10-CM

## 2024-10-28 DIAGNOSIS — M81.0 AGE-RELATED OSTEOPOROSIS WITHOUT CURRENT PATHOLOGICAL FRACTURE: ICD-10-CM

## 2024-10-28 DIAGNOSIS — Z86.73 HISTORY OF STROKE: ICD-10-CM

## 2024-10-28 DIAGNOSIS — K21.9 GASTROESOPHAGEAL REFLUX DISEASE WITHOUT ESOPHAGITIS: ICD-10-CM

## 2024-10-28 DIAGNOSIS — C61 PROSTATE CANCER (HCC): ICD-10-CM

## 2024-10-28 DIAGNOSIS — Z23 NEEDS FLU SHOT: ICD-10-CM

## 2024-10-28 PROCEDURE — 90653 IIV ADJUVANT VACCINE IM: CPT | Performed by: NURSE PRACTITIONER

## 2024-10-28 PROCEDURE — 3078F DIAST BP <80 MM HG: CPT | Performed by: NURSE PRACTITIONER

## 2024-10-28 PROCEDURE — G0008 ADMIN INFLUENZA VIRUS VAC: HCPCS | Performed by: NURSE PRACTITIONER

## 2024-10-28 PROCEDURE — 1159F MED LIST DOCD IN RCRD: CPT | Performed by: NURSE PRACTITIONER

## 2024-10-28 PROCEDURE — 1123F ACP DISCUSS/DSCN MKR DOCD: CPT | Performed by: NURSE PRACTITIONER

## 2024-10-28 PROCEDURE — 3074F SYST BP LT 130 MM HG: CPT | Performed by: NURSE PRACTITIONER

## 2024-10-28 PROCEDURE — 1160F RVW MEDS BY RX/DR IN RCRD: CPT | Performed by: NURSE PRACTITIONER

## 2024-10-28 PROCEDURE — 99214 OFFICE O/P EST MOD 30 MIN: CPT | Performed by: NURSE PRACTITIONER

## 2024-10-28 RX ORDER — PANTOPRAZOLE SODIUM 40 MG/1
40 TABLET, DELAYED RELEASE ORAL DAILY
Qty: 180 TABLET | Refills: 3
Start: 2024-10-28

## 2024-10-28 ASSESSMENT — PATIENT HEALTH QUESTIONNAIRE - PHQ9
SUM OF ALL RESPONSES TO PHQ QUESTIONS 1-9: 0
1. LITTLE INTEREST OR PLEASURE IN DOING THINGS: NOT AT ALL
SUM OF ALL RESPONSES TO PHQ QUESTIONS 1-9: 0
2. FEELING DOWN, DEPRESSED OR HOPELESS: NOT AT ALL
SUM OF ALL RESPONSES TO PHQ QUESTIONS 1-9: 0
SUM OF ALL RESPONSES TO PHQ QUESTIONS 1-9: 0
SUM OF ALL RESPONSES TO PHQ9 QUESTIONS 1 & 2: 0

## 2024-10-28 ASSESSMENT — ENCOUNTER SYMPTOMS
NAUSEA: 0
VOMITING: 0
DIARRHEA: 0
CONSTIPATION: 0
SHORTNESS OF BREATH: 0
BACK PAIN: 0

## 2024-10-28 NOTE — TELEPHONE ENCOUNTER
----- Message from PATRICIA STAFFORD MA sent at 10/28/2024  4:48 PM EDT -----  Regarding: FW: Lab Reorders    ----- Message -----  From: Joseline Bach  Sent: 10/28/2024   3:50 PM EDT  To: #  Subject: Lab Reorders                                     The following lab test(s) were not completed.  Lab tests:  phosphorous, Vitamin D, BMP  Recollect reason: Orders released in error    Please consult with the ordering physician/APC if the tests are needed.    If this test requires recollection, please re-enter order in Epic within 24 hours of this notice and respond to this message as Client Services will contact the patient.     If this test DOES NOT require recollection, document this in Epic documentation only encounter.    If you need additional information, respond to this message and/or call [phone].    [facility] Client Services

## 2024-10-28 NOTE — PROGRESS NOTES
3    8. Needs flu shot  - Influenza, FLUAD Trivalent, (age 65 y+), IM, Preservative Free, 0.5mL          Flores Garrett, APRN - CNP

## 2024-10-30 PROCEDURE — 93298 REM INTERROG DEV EVAL SCRMS: CPT | Performed by: INTERNAL MEDICINE

## 2024-10-31 DIAGNOSIS — M81.0 AGE-RELATED OSTEOPOROSIS WITHOUT CURRENT PATHOLOGICAL FRACTURE: ICD-10-CM

## 2024-10-31 DIAGNOSIS — I10 ESSENTIAL HYPERTENSION: ICD-10-CM

## 2024-10-31 LAB
25(OH)D3 SERPL-MCNC: 68.4 NG/ML (ref 30–100)
ANION GAP SERPL CALC-SCNC: 9 MMOL/L (ref 7–16)
BUN SERPL-MCNC: 12 MG/DL (ref 8–23)
CALCIUM SERPL-MCNC: 9.1 MG/DL (ref 8.8–10.2)
CHLORIDE SERPL-SCNC: 104 MMOL/L (ref 98–107)
CO2 SERPL-SCNC: 27 MMOL/L (ref 20–29)
CREAT SERPL-MCNC: 0.89 MG/DL (ref 0.8–1.3)
GLUCOSE SERPL-MCNC: 98 MG/DL (ref 70–99)
PHOSPHATE SERPL-MCNC: 3.2 MG/DL (ref 2.5–4.5)
POTASSIUM SERPL-SCNC: 4.3 MMOL/L (ref 3.5–5.1)
SODIUM SERPL-SCNC: 140 MMOL/L (ref 136–145)

## 2024-11-01 ENCOUNTER — TELEPHONE (OUTPATIENT)
Dept: INTERNAL MEDICINE CLINIC | Facility: CLINIC | Age: 74
End: 2024-11-01

## 2024-11-01 NOTE — TELEPHONE ENCOUNTER
----- Message from Dr. Vinicio Junior MD sent at 11/1/2024  1:37 PM EDT -----  Labs are good. Should be able to have prolia  injection.   Vinicio Junior MD

## 2024-12-02 PROCEDURE — 93298 REM INTERROG DEV EVAL SCRMS: CPT | Performed by: INTERNAL MEDICINE

## 2024-12-11 NOTE — PROGRESS NOTES
Urologic Oncology  [facility] Hematology & Oncology  [address] [place] [id number]  [phone]        Mr. Mekhi Carrillo is a 74 y.o. male with a diagnosis of PCa s/p RALP with BPLND on 7/18/23, PROSTATIC ADENOCARCINOMA (RONALD 3 + 4 = 7 (WITH FOCAL TERTIARY 5)), pT2N0 (0/18 nodes).     INTERVAL HISTORY: Patient is here today with his wife.  He has a history of prostate cancer and underwent robotic prostatectomy on 7/18/2023 for [place] 3+4 equal 7, pT2 N0.  He has done quite well and has no complaints today.  His PSA on 12/13/2024 was undetectable at less than 0.006.    He is not using any pads and has no significant stress urinary incontinence.  He voids with a strong stream and denies any gross hematuria.      From previous note on 6/10/24: Patient returns today for follow-up evaluation of his [place] 3+4=7 prostate cancer with focal tertiary 5 disease status post RALP w BPLND on 7/18/2023.  He has been doing well since last visit denies any lower urinary tract symptoms.  No dysuria or hematuria.  He is voiding well.  He denies any significant incontinence though he does have some urinary urgency.  He is aware of this and tries his best to fully empty his bladder prior to any periods where he may have difficult time getting to the restroom.     Patient has discontinued use of sildenafil due to insignificant erection for intercourse.  He has noticed his best benefit from a erection vacuum device.  He has not met with Jacquie Senior to discuss Trimix therapy and is not interested at this time.    Past medical, family and social histories, as well as medications and allergies, were reviewed and updated in the medical record as appropriate.    PMH:     Past Medical History:   Diagnosis Date    Age related osteoporosis     Congenital tracheoesophageal fistula, esophageal atresia and stenosis     pt denies- not aware of    Current use of long term anticoagulation     Plavix and Aspirin    Esophageal

## 2024-12-13 ENCOUNTER — HOSPITAL ENCOUNTER (OUTPATIENT)
Dept: LAB | Age: 74
Discharge: HOME | End: 2024-12-13
Payer: MEDICARE

## 2024-12-13 DIAGNOSIS — C61 PROSTATE CANCER (HCC): ICD-10-CM

## 2024-12-13 PROCEDURE — 36415 COLL VENOUS BLD VENIPUNCTURE: CPT

## 2024-12-13 PROCEDURE — 84153 ASSAY OF PSA TOTAL: CPT

## 2024-12-15 LAB — PSA SERPL DL<=0.01 NG/ML-MCNC: <0.006 NG/ML (ref 0–4)

## 2024-12-16 ENCOUNTER — OFFICE VISIT (OUTPATIENT)
Dept: ONCOLOGY | Age: 74
End: 2024-12-16
Payer: MEDICARE

## 2024-12-16 VITALS
TEMPERATURE: 97.4 F | SYSTOLIC BLOOD PRESSURE: 155 MMHG | WEIGHT: 149.7 LBS | RESPIRATION RATE: 16 BRPM | HEIGHT: 64 IN | DIASTOLIC BLOOD PRESSURE: 78 MMHG | HEART RATE: 50 BPM | BODY MASS INDEX: 25.56 KG/M2 | OXYGEN SATURATION: 100 %

## 2024-12-16 DIAGNOSIS — C61 PROSTATE CANCER (HCC): Primary | ICD-10-CM

## 2024-12-16 PROCEDURE — 1123F ACP DISCUSS/DSCN MKR DOCD: CPT | Performed by: UROLOGY

## 2024-12-16 PROCEDURE — 3077F SYST BP >= 140 MM HG: CPT | Performed by: UROLOGY

## 2024-12-16 PROCEDURE — 3078F DIAST BP <80 MM HG: CPT | Performed by: UROLOGY

## 2024-12-16 PROCEDURE — 99213 OFFICE O/P EST LOW 20 MIN: CPT | Performed by: UROLOGY

## 2024-12-16 PROCEDURE — 1159F MED LIST DOCD IN RCRD: CPT | Performed by: UROLOGY

## 2024-12-16 PROCEDURE — 1160F RVW MEDS BY RX/DR IN RCRD: CPT | Performed by: UROLOGY

## 2024-12-16 PROCEDURE — 1126F AMNT PAIN NOTED NONE PRSNT: CPT | Performed by: UROLOGY

## 2024-12-16 ASSESSMENT — ENCOUNTER SYMPTOMS
RESPIRATORY NEGATIVE: 1
GASTROINTESTINAL NEGATIVE: 1

## 2024-12-16 ASSESSMENT — PATIENT HEALTH QUESTIONNAIRE - PHQ9
SUM OF ALL RESPONSES TO PHQ QUESTIONS 1-9: 0
SUM OF ALL RESPONSES TO PHQ QUESTIONS 1-9: 0
1. LITTLE INTEREST OR PLEASURE IN DOING THINGS: NOT AT ALL
SUM OF ALL RESPONSES TO PHQ QUESTIONS 1-9: 0
2. FEELING DOWN, DEPRESSED OR HOPELESS: NOT AT ALL
SUM OF ALL RESPONSES TO PHQ9 QUESTIONS 1 & 2: 0
SUM OF ALL RESPONSES TO PHQ QUESTIONS 1-9: 0

## 2024-12-16 NOTE — PATIENT INSTRUCTIONS
within 24 hours of discharge.    -------------------------------------------------------------------------------------------------------------------  Please call our office at [phone] if you have any  of the following symptoms:   Fever of 100.5 or greater  Chills  Shortness of breath  Swelling or pain in one leg    After office hours an answering service is available and will contact a provider for emergencies or if you are experiencing any of the above symptoms.        Anny Jones MA

## 2024-12-19 RX ORDER — AMLODIPINE BESYLATE 2.5 MG/1
TABLET ORAL
Qty: 90 TABLET | Refills: 3 | Status: SHIPPED | OUTPATIENT
Start: 2024-12-19

## 2024-12-19 RX ORDER — SIMVASTATIN 20 MG
TABLET ORAL
Qty: 90 TABLET | Refills: 3 | Status: SHIPPED | OUTPATIENT
Start: 2024-12-19

## 2025-02-06 ENCOUNTER — TELEPHONE (OUTPATIENT)
Dept: INTERNAL MEDICINE CLINIC | Facility: CLINIC | Age: 75
End: 2025-02-06

## 2025-02-06 DIAGNOSIS — K21.9 GASTROESOPHAGEAL REFLUX DISEASE WITHOUT ESOPHAGITIS: ICD-10-CM

## 2025-02-06 DIAGNOSIS — R19.5 POSITIVE FIT (FECAL IMMUNOCHEMICAL TEST): Primary | ICD-10-CM

## 2025-02-06 LAB
HEMOCCULT STL QL: POSITIVE
VALID INTERNAL CONTROL: YES

## 2025-02-06 PROCEDURE — G0328 FECAL BLOOD SCRN IMMUNOASSAY: HCPCS | Performed by: NURSE PRACTITIONER

## 2025-02-06 NOTE — TELEPHONE ENCOUNTER
Please call and schedule lab only visit.  Thanks!        I called and discussed positive FIT testing with patient.(He opted for this instead of colonoscopy,which is overdue).    He reports no abnormal or dark stools.  On a baby aspirin daily.  Due for colonoscopy and referred to GI with positive FIT.  Knows to call for any new or concerning symptoms.  Has follow-up with PCP.

## 2025-02-07 DIAGNOSIS — R19.5 POSITIVE FIT (FECAL IMMUNOCHEMICAL TEST): ICD-10-CM

## 2025-02-07 LAB
BASOPHILS # BLD: 0.06 K/UL (ref 0–0.2)
BASOPHILS NFR BLD: 0.9 % (ref 0–2)
DIFFERENTIAL METHOD BLD: NORMAL
EOSINOPHIL # BLD: 0.3 K/UL (ref 0–0.8)
EOSINOPHIL NFR BLD: 4.7 % (ref 0.5–7.8)
ERYTHROCYTE [DISTWIDTH] IN BLOOD BY AUTOMATED COUNT: 12.7 % (ref 11.9–14.6)
HCT VFR BLD AUTO: 42.5 % (ref 41.1–50.3)
HGB BLD-MCNC: 14.3 G/DL (ref 13.6–17.2)
IMM GRANULOCYTES # BLD AUTO: 0.02 K/UL (ref 0–0.5)
IMM GRANULOCYTES NFR BLD AUTO: 0.3 % (ref 0–5)
LYMPHOCYTES # BLD: 2.11 K/UL (ref 0.5–4.6)
LYMPHOCYTES NFR BLD: 32.7 % (ref 13–44)
MCH RBC QN AUTO: 31.7 PG (ref 26.1–32.9)
MCHC RBC AUTO-ENTMCNC: 33.6 G/DL (ref 31.4–35)
MCV RBC AUTO: 94.2 FL (ref 82–102)
MONOCYTES # BLD: 0.7 K/UL (ref 0.1–1.3)
MONOCYTES NFR BLD: 10.9 % (ref 4–12)
NEUTS SEG # BLD: 3.26 K/UL (ref 1.7–8.2)
NEUTS SEG NFR BLD: 50.5 % (ref 43–78)
NRBC # BLD: 0 K/UL (ref 0–0.2)
PLATELET # BLD AUTO: 184 K/UL (ref 150–450)
PMV BLD AUTO: 9.7 FL (ref 9.4–12.3)
RBC # BLD AUTO: 4.51 M/UL (ref 4.23–5.6)
WBC # BLD AUTO: 6.5 K/UL (ref 4.3–11.1)

## 2025-03-31 PROCEDURE — 93298 REM INTERROG DEV EVAL SCRMS: CPT | Performed by: INTERNAL MEDICINE

## 2025-04-17 RX ORDER — FAMOTIDINE 20 MG/1
20 TABLET, FILM COATED ORAL DAILY
Qty: 90 TABLET | Refills: 3 | Status: SHIPPED | OUTPATIENT
Start: 2025-04-17

## 2025-04-25 SDOH — ECONOMIC STABILITY: FOOD INSECURITY: WITHIN THE PAST 12 MONTHS, YOU WORRIED THAT YOUR FOOD WOULD RUN OUT BEFORE YOU GOT MONEY TO BUY MORE.: NEVER TRUE

## 2025-04-25 SDOH — ECONOMIC STABILITY: FOOD INSECURITY: WITHIN THE PAST 12 MONTHS, THE FOOD YOU BOUGHT JUST DIDN'T LAST AND YOU DIDN'T HAVE MONEY TO GET MORE.: NEVER TRUE

## 2025-04-25 SDOH — ECONOMIC STABILITY: INCOME INSECURITY: IN THE LAST 12 MONTHS, WAS THERE A TIME WHEN YOU WERE NOT ABLE TO PAY THE MORTGAGE OR RENT ON TIME?: NO

## 2025-04-25 SDOH — ECONOMIC STABILITY: TRANSPORTATION INSECURITY
IN THE PAST 12 MONTHS, HAS THE LACK OF TRANSPORTATION KEPT YOU FROM MEDICAL APPOINTMENTS OR FROM GETTING MEDICATIONS?: NO

## 2025-04-25 ASSESSMENT — PATIENT HEALTH QUESTIONNAIRE - PHQ9
1. LITTLE INTEREST OR PLEASURE IN DOING THINGS: NOT AT ALL
2. FEELING DOWN, DEPRESSED OR HOPELESS: NOT AT ALL
2. FEELING DOWN, DEPRESSED OR HOPELESS: NOT AT ALL
1. LITTLE INTEREST OR PLEASURE IN DOING THINGS: NOT AT ALL
SUM OF ALL RESPONSES TO PHQ QUESTIONS 1-9: 0
SUM OF ALL RESPONSES TO PHQ9 QUESTIONS 1 & 2: 0
SUM OF ALL RESPONSES TO PHQ QUESTIONS 1-9: 0

## 2025-04-26 SDOH — HEALTH STABILITY: PHYSICAL HEALTH: ON AVERAGE, HOW MANY MINUTES DO YOU ENGAGE IN EXERCISE AT THIS LEVEL?: 30 MIN

## 2025-04-26 SDOH — HEALTH STABILITY: PHYSICAL HEALTH: ON AVERAGE, HOW MANY DAYS PER WEEK DO YOU ENGAGE IN MODERATE TO STRENUOUS EXERCISE (LIKE A BRISK WALK)?: 4 DAYS

## 2025-04-26 ASSESSMENT — LIFESTYLE VARIABLES
HOW OFTEN DO YOU HAVE SIX OR MORE DRINKS ON ONE OCCASION: 1
HOW OFTEN DO YOU HAVE A DRINK CONTAINING ALCOHOL: 2-4 TIMES A MONTH
HOW MANY STANDARD DRINKS CONTAINING ALCOHOL DO YOU HAVE ON A TYPICAL DAY: 1 OR 2
HOW MANY STANDARD DRINKS CONTAINING ALCOHOL DO YOU HAVE ON A TYPICAL DAY: 1
HOW OFTEN DO YOU HAVE A DRINK CONTAINING ALCOHOL: 3

## 2025-04-28 ENCOUNTER — TELEPHONE (OUTPATIENT)
Age: 75
End: 2025-04-28

## 2025-04-28 ENCOUNTER — OFFICE VISIT (OUTPATIENT)
Dept: INTERNAL MEDICINE CLINIC | Facility: CLINIC | Age: 75
End: 2025-04-28
Payer: MEDICARE

## 2025-04-28 VITALS
SYSTOLIC BLOOD PRESSURE: 135 MMHG | OXYGEN SATURATION: 98 % | DIASTOLIC BLOOD PRESSURE: 69 MMHG | HEART RATE: 58 BPM | BODY MASS INDEX: 25.95 KG/M2 | HEIGHT: 64 IN | WEIGHT: 152 LBS

## 2025-04-28 DIAGNOSIS — C61 PROSTATE CANCER (HCC): ICD-10-CM

## 2025-04-28 DIAGNOSIS — E78.2 MIXED HYPERLIPIDEMIA: ICD-10-CM

## 2025-04-28 DIAGNOSIS — M81.0 AGE-RELATED OSTEOPOROSIS WITHOUT CURRENT PATHOLOGICAL FRACTURE: ICD-10-CM

## 2025-04-28 DIAGNOSIS — Z00.00 MEDICARE ANNUAL WELLNESS VISIT, SUBSEQUENT: Primary | ICD-10-CM

## 2025-04-28 DIAGNOSIS — K21.9 GASTROESOPHAGEAL REFLUX DISEASE WITHOUT ESOPHAGITIS: ICD-10-CM

## 2025-04-28 DIAGNOSIS — I10 ESSENTIAL HYPERTENSION: ICD-10-CM

## 2025-04-28 LAB
25(OH)D3 SERPL-MCNC: 81.6 NG/ML (ref 30–100)
ANION GAP SERPL CALC-SCNC: 10 MMOL/L (ref 7–16)
BUN SERPL-MCNC: 10 MG/DL (ref 8–23)
CALCIUM SERPL-MCNC: 9.3 MG/DL (ref 8.8–10.2)
CHLORIDE SERPL-SCNC: 104 MMOL/L (ref 98–107)
CO2 SERPL-SCNC: 25 MMOL/L (ref 20–29)
CREAT SERPL-MCNC: 1.01 MG/DL (ref 0.8–1.3)
GLUCOSE SERPL-MCNC: 99 MG/DL (ref 70–99)
POTASSIUM SERPL-SCNC: 4.3 MMOL/L (ref 3.5–5.1)
SODIUM SERPL-SCNC: 139 MMOL/L (ref 136–145)

## 2025-04-28 PROCEDURE — 3078F DIAST BP <80 MM HG: CPT | Performed by: INTERNAL MEDICINE

## 2025-04-28 PROCEDURE — 3075F SYST BP GE 130 - 139MM HG: CPT | Performed by: INTERNAL MEDICINE

## 2025-04-28 PROCEDURE — G2211 COMPLEX E/M VISIT ADD ON: HCPCS | Performed by: INTERNAL MEDICINE

## 2025-04-28 PROCEDURE — 1159F MED LIST DOCD IN RCRD: CPT | Performed by: INTERNAL MEDICINE

## 2025-04-28 PROCEDURE — 99214 OFFICE O/P EST MOD 30 MIN: CPT | Performed by: INTERNAL MEDICINE

## 2025-04-28 PROCEDURE — G0439 PPPS, SUBSEQ VISIT: HCPCS | Performed by: INTERNAL MEDICINE

## 2025-04-28 PROCEDURE — 1123F ACP DISCUSS/DSCN MKR DOCD: CPT | Performed by: INTERNAL MEDICINE

## 2025-04-28 RX ORDER — FAMOTIDINE 40 MG/1
TABLET, FILM COATED ORAL
COMMUNITY
Start: 2025-02-13

## 2025-04-28 NOTE — TELEPHONE ENCOUNTER
[employer] loop alert for episodes of rapid  rate on Saturday around 4 pm lasting less than 1 minute. Loop placed for stroke.     Patient reports he had finished cutting grass and didn't feel well ( fatigued and nauseas).     Please review and advise.

## 2025-04-28 NOTE — PROGRESS NOTES
Medicare Annual Wellness Visit    Mekhi Carrillo is here for Medicare AWV    Assessment & Plan   Medicare annual wellness visit, subsequent     No follow-ups on file.     Subjective       Patient's complete Health Risk Assessment and screening values have been reviewed and are found in Flowsheets. The following problems were reviewed today and where indicated follow up appointments were made and/or referrals ordered.    Positive Risk Factor Screenings with Interventions:                    Safety:  Do you have any tripping hazards - loose or unsecured carpets or rugs?: (!) (Patient-Rptd) Yes  Interventions:  See AVS for additional education material                   Objective   Vitals:    04/28/25 1425   BP: 135/69   BP Site: Right Upper Arm   Pulse: 58   SpO2: 98%   Weight: 68.9 kg (152 lb)   Height: 1.613 m (5' 3.5\")      Body mass index is 26.5 kg/m².                  No Known Allergies  Prior to Visit Medications    Medication Sig Taking? Authorizing Provider   famotidine (PEPCID) 40 MG tablet  Yes Erick Bardales MD   famotidine (PEPCID) 20 MG tablet TAKE ONE TABLET BY MOUTH EVERY DAY Yes Vinicio Junior MD   simvastatin (ZOCOR) 20 MG tablet TAKE ONE TABLET BY MOUTH EVERY EVENING FOR CHOLESTEROL Yes Vinicio Junior MD   amLODIPine (NORVASC) 2.5 MG tablet TAKE ONE TABLET BY MOUTH EVERY DAY FOR BLOOD PRESSURE Yes Vinicio Junior MD   pantoprazole (PROTONIX) 40 MG tablet Take 1 tablet by mouth daily Yes Flores Garrett, HOLLY - CNP   PROLIA 60 MG/ML SOSY SC injection Inject 1 mL into the skin every 6 months Yes Vinicio Junior MD   triamcinolone (KENALOG) 0.1 % cream Apply topically 2 times daily as needed Apply topically 2 times daily. Yes Erick Bardales MD   Multiple Vitamins-Minerals (MULTIVITAMIN ADULT, MINERALS,) TABS Take 1 tablet by mouth daily Yes Erick Bardales MD   calcium carbonate (CALCIUM 600) 600 MG TABS tablet Take 1 tablet by mouth 2 times daily Yes Vernon

## 2025-05-01 ENCOUNTER — RESULTS FOLLOW-UP (OUTPATIENT)
Dept: INTERNAL MEDICINE CLINIC | Facility: CLINIC | Age: 75
End: 2025-05-01

## 2025-05-05 NOTE — TELEPHONE ENCOUNTER
Shemar Monge III, MD Briggs, Jennifer RN  Caller: Unspecified (1 week ago,  1:51 PM)  Is pretty fast, does not like A-fib though.  Make sure he is hydrating aggressively.  If he has any further episodes, we will need to adjust his medications.  Does not look like he has an appointment scheduled, lets get him in the next 3 months or so.      Patient notified and appt scheduled. He will call with anything additional.

## 2025-05-15 PROCEDURE — 93298 REM INTERROG DEV EVAL SCRMS: CPT | Performed by: INTERNAL MEDICINE

## 2025-06-13 ENCOUNTER — HOSPITAL ENCOUNTER (OUTPATIENT)
Dept: LAB | Age: 75
Discharge: HOME | End: 2025-06-13
Payer: MEDICARE

## 2025-06-13 DIAGNOSIS — C61 PROSTATE CANCER (HCC): ICD-10-CM

## 2025-06-13 PROCEDURE — 36415 COLL VENOUS BLD VENIPUNCTURE: CPT

## 2025-06-13 PROCEDURE — 84153 ASSAY OF PSA TOTAL: CPT

## 2025-06-13 NOTE — PROGRESS NOTES
Urologic Oncology  [facility] Hematology & Oncology  [address] [place] [id number]  [phone]        Mr. Mekhi Carrillo is a 75 y.o. male with a diagnosis of PCa s/p RALP with BPLND on 7/18/23, PROSTATIC ADENOCARCINOMA (RONALD 3 + 4 = 7 (WITH FOCAL TERTIARY 5)), pT2N0 (0/18 nodes).     INTERVAL HISTORY: Patient is here today with his wife.  He underwent a robotic prostatectomy on 7/18/2023 for [place] 3+4, pT2 N0.    He has no complaints today.  He denies any stress urinary incontinence.  He does have some ongoing ED and is interested in trying again sildenafil.    His PSA on 6/13/2025 was less than 0.006.      From previous note on 12/16/24:Patient is here today with his wife.  He has a history of prostate cancer and underwent robotic prostatectomy on 7/18/2023 for [place] 3+4 equal 7, pT2 N0.  He has done quite well and has no complaints today.  His PSA on 12/13/2024 was undetectable at less than 0.006.     He is not using any pads and has no significant stress urinary incontinence.  He voids with a strong stream and denies any gross hematuria.    Past medical, family and social histories, as well as medications and allergies, were reviewed and updated in the medical record as appropriate.    PMH:     Past Medical History:   Diagnosis Date    Age related osteoporosis     Cerebrovascular disease 04/01/23    3rd TIA    Congenital tracheoesophageal fistula, esophageal atresia and stenosis     pt denies- not aware of    Current use of long term anticoagulation     Plavix and Aspirin    Erectile dysfunction 07/18/23    Robotic Assisted Radical Prostatectomy    Esophageal reflux     Essential hypertension     GERD (gastroesophageal reflux disease)     on meds    Hiatal hernia     History of COVID-19 04/2020    no hospitalization required    History of TIA (transient ischemic attack) 2017    x 3 TIA; on plavix (last TIA 4/1/2023)    Hypercholesterolemia     Hyperlipidemia     Inguinal hernia

## 2025-06-15 LAB — PSA SERPL DL<=0.01 NG/ML-MCNC: <0.006 NG/ML (ref 0–4)

## 2025-06-16 ENCOUNTER — OFFICE VISIT (OUTPATIENT)
Age: 75
End: 2025-06-16
Payer: MEDICARE

## 2025-06-16 VITALS
OXYGEN SATURATION: 99 % | TEMPERATURE: 97.9 F | WEIGHT: 152 LBS | HEIGHT: 64 IN | RESPIRATION RATE: 16 BRPM | HEART RATE: 65 BPM | DIASTOLIC BLOOD PRESSURE: 83 MMHG | BODY MASS INDEX: 25.95 KG/M2 | SYSTOLIC BLOOD PRESSURE: 151 MMHG

## 2025-06-16 DIAGNOSIS — C61 PROSTATE CANCER (HCC): Primary | ICD-10-CM

## 2025-06-16 PROCEDURE — 1123F ACP DISCUSS/DSCN MKR DOCD: CPT | Performed by: UROLOGY

## 2025-06-16 PROCEDURE — 3079F DIAST BP 80-89 MM HG: CPT | Performed by: UROLOGY

## 2025-06-16 PROCEDURE — 99213 OFFICE O/P EST LOW 20 MIN: CPT | Performed by: UROLOGY

## 2025-06-16 PROCEDURE — 1160F RVW MEDS BY RX/DR IN RCRD: CPT | Performed by: UROLOGY

## 2025-06-16 PROCEDURE — 1126F AMNT PAIN NOTED NONE PRSNT: CPT | Performed by: UROLOGY

## 2025-06-16 PROCEDURE — 3077F SYST BP >= 140 MM HG: CPT | Performed by: UROLOGY

## 2025-06-16 PROCEDURE — 1159F MED LIST DOCD IN RCRD: CPT | Performed by: UROLOGY

## 2025-06-16 ASSESSMENT — PATIENT HEALTH QUESTIONNAIRE - PHQ9
2. FEELING DOWN, DEPRESSED OR HOPELESS: NOT AT ALL
SUM OF ALL RESPONSES TO PHQ QUESTIONS 1-9: 0
1. LITTLE INTEREST OR PLEASURE IN DOING THINGS: NOT AT ALL
SUM OF ALL RESPONSES TO PHQ QUESTIONS 1-9: 0

## 2025-06-16 ASSESSMENT — ENCOUNTER SYMPTOMS
GASTROINTESTINAL NEGATIVE: 1
RESPIRATORY NEGATIVE: 1

## 2025-06-16 NOTE — PATIENT INSTRUCTIONS
Patient Information from Today's Visit    The members of your [facility] are listed below:    Physician Provider: Shemar Somers Urologic Oncologist  Advanced Practice Clinician: LUCY Ann  Medical Assistant: Anny GRACE CMA  :Adry ABDI  Supportive Care Services: Stanley HARVEY LMSW    Diagnosis (Information Sheet Provided on Day of Diagnosis): Prostate      Follow Up Instructions:   Labs reviewed  Continue the Sildenafil as prescribed  We will plan to see you back in 1 year. If you need anything prior please do not hesitate to call our office.  Has Treatment Plan Been Finalized? No    Current Labs:   No visits with results within 3 Day(s) from this visit.   Latest known visit with results is:   Hospital Outpatient Visit on 06/13/2025   Component Date Value Ref Range Status    PSA, Ultrasensitive 06/13/2025 <0.006  0.000 - 4.000 ng/mL Final    Comment: (NOTE)  Roche ECLIA methodology.  According to the American Urological Association, Serum PSA should  decrease and remain at undetectable levels after radical  prostatectomy. The AUA defines biochemical recurrence as an initial  PSA value 0.200 ng/mL or greater followed by a subsequent  confirmatory PSA value 0.200 ng/mL or greater.  Values obtained with different assay methods or kits cannot be used  interchangeably. Results cannot be interpreted as absolute evidence  of the presence or absence of malignant disease.  Performed At: [address] [id number]  Kevin Humphrey MD Ph:[phone]             Please refer to After Visit Summary or [email or web address] for upcoming appointment information. Please call our office for rescheduling needs at least 24 hours before your scheduled appointment time.If you have any questions regarding your upcoming schedule please reach out to your care team through [facility] or call [phone].     Please notify your assigned Nurse Navigator of any unplanned hospital admissions

## 2025-07-02 PROCEDURE — 93298 REM INTERROG DEV EVAL SCRMS: CPT | Performed by: INTERNAL MEDICINE

## 2025-08-04 ENCOUNTER — OFFICE VISIT (OUTPATIENT)
Age: 75
End: 2025-08-04
Payer: MEDICARE

## 2025-08-04 VITALS
BODY MASS INDEX: 24.56 KG/M2 | HEART RATE: 74 BPM | WEIGHT: 152.8 LBS | DIASTOLIC BLOOD PRESSURE: 86 MMHG | HEIGHT: 66 IN | SYSTOLIC BLOOD PRESSURE: 138 MMHG

## 2025-08-04 DIAGNOSIS — R00.2 PALPITATIONS: ICD-10-CM

## 2025-08-04 DIAGNOSIS — Z98.890 HISTORY OF LOOP RECORDER: ICD-10-CM

## 2025-08-04 DIAGNOSIS — G45.9 TIA (TRANSIENT ISCHEMIC ATTACK): Primary | ICD-10-CM

## 2025-08-04 PROCEDURE — 3079F DIAST BP 80-89 MM HG: CPT | Performed by: INTERNAL MEDICINE

## 2025-08-04 PROCEDURE — 99213 OFFICE O/P EST LOW 20 MIN: CPT | Performed by: INTERNAL MEDICINE

## 2025-08-04 PROCEDURE — 1159F MED LIST DOCD IN RCRD: CPT | Performed by: INTERNAL MEDICINE

## 2025-08-04 PROCEDURE — 3075F SYST BP GE 130 - 139MM HG: CPT | Performed by: INTERNAL MEDICINE

## 2025-08-04 PROCEDURE — 1123F ACP DISCUSS/DSCN MKR DOCD: CPT | Performed by: INTERNAL MEDICINE

## 2025-08-04 ASSESSMENT — ENCOUNTER SYMPTOMS: ABDOMINAL PAIN: 0

## 2025-08-19 PROCEDURE — 93298 REM INTERROG DEV EVAL SCRMS: CPT | Performed by: INTERNAL MEDICINE

## (undated) DEVICE — COVER PRB W1XL11.8IN ENDOCAVITY CLR E BND NEOGUARD

## (undated) DEVICE — DRAPE TWL SURG 16X26IN BLU ORB04] ALLCARE INC]

## (undated) DEVICE — CANNULA SEAL

## (undated) DEVICE — NEEDLE INSUF L150MM DIA2MM DISP FOR PNEUMOPERI ENDOPATH

## (undated) DEVICE — ARM DRAPE

## (undated) DEVICE — SUTURE PDS II SZ 1 L27IN ABSRB VLT CT-1 L36MM 1/2 CIR Z341H

## (undated) DEVICE — STERILE PACKED. BORE DIAMETER 1.6 MM; ANGLE OF INSERTION 19° TO THE LONG AXIS OF THE TRANSDUCER: Brand: SINGLE-USE BIPLANE BIOPSY GUIDE

## (undated) DEVICE — ROBOTIC PROSTATE: Brand: MEDLINE INDUSTRIES, INC.

## (undated) DEVICE — ABSORBENT, WATERPROOF, BACTERIA PROOF FILM DRESSING: Brand: OPSITE POST OP 9.5X8.5CM CTN 20

## (undated) DEVICE — ADHESIVE SKIN CLSR 0.7ML TOP DERMBND ADV

## (undated) DEVICE — AGENT HEMSTAT 3GM OXIDIZED REGENERATED CELOS ABSRB FOR CONT (ORDER MULTIPLES OF 5EA)

## (undated) DEVICE — BLADELESS OBTURATOR: Brand: WECK VISTA

## (undated) DEVICE — AGENT HEMSTAT W2XL14IN OXIDIZED REGENERATED CELOS ABSRB FOR

## (undated) DEVICE — SUTURE VLOC 2-0 GS-21 9IN ABSORBABLE VLOCL0345

## (undated) DEVICE — DRSG POSTOP PRMSL AG 3.5X4IN

## (undated) DEVICE — TIP COVER ACCESSORY

## (undated) DEVICE — BAG,DRAINAGE,4L,A/R TOWER,LL,SLIDE TAP: Brand: MEDLINE

## (undated) DEVICE — AIRSEAL 12 MM ACCESS PORT AND PALM GRIP OBTURATOR WITH BLADELESS OPTICAL TIP, 120 MM LENGTH: Brand: AIRSEAL

## (undated) DEVICE — MONITOR CRD 1.4 CC 3.4 GM INSERTABLE LINQ

## (undated) DEVICE — SUTURE V-LOC 90 3-0 L6IN ABSRB UD CV-23 L17MM 1/2 CIR VLOCM1904

## (undated) DEVICE — SURGICEL ENDOSCP APPL

## (undated) DEVICE — PAD PT POS 36 IN SURGYPAD DISP

## (undated) DEVICE — TRI-LUMEN FILTERED TUBE SET WITH ACTIVATED CHARCOAL FILTER: Brand: AIRSEAL

## (undated) DEVICE — SUTURE V-LOC 180 SZ 3-0 L6IN ABSRB VLT CV-23 L17MM 1/2 CIR VLOCM0804

## (undated) DEVICE — MAX-CORE® DISPOSABLE CORE BIOPSY INSTRUMENT, 18G X 25CM: Brand: MAX-CORE

## (undated) DEVICE — COLUMN DRAPE

## (undated) DEVICE — ANCHOR TISSUE RETRIEVAL SYSTEM, BAG SIZE 250 ML, PORT SIZE 12 MM: Brand: ANCHOR TISSUE RETRIEVAL SYSTEM

## (undated) DEVICE — DRAIN SURG 19FR 100% SIL RADPQ RND CHN FULL FLUT

## (undated) DEVICE — PUMP SUC IRR TBNG L10FT W/ HNDPC ASSEMB STRYKEFLOW 2

## (undated) DEVICE — GLOVE SURG SZ 75 L12IN FNGR THK79MIL GRN LTX FREE